# Patient Record
Sex: MALE | Race: WHITE | NOT HISPANIC OR LATINO | URBAN - METROPOLITAN AREA
[De-identification: names, ages, dates, MRNs, and addresses within clinical notes are randomized per-mention and may not be internally consistent; named-entity substitution may affect disease eponyms.]

---

## 2017-01-01 ENCOUNTER — HOME CARE VISIT (OUTPATIENT)
Dept: HOSPICE | Facility: HOSPICE | Age: 82
End: 2017-01-01
Payer: MEDICARE

## 2017-01-01 ENCOUNTER — RESOLUTE PROFESSIONAL BILLING HOSPITAL PROF FEE (OUTPATIENT)
Dept: HOSPITALIST | Facility: MEDICAL CENTER | Age: 82
End: 2017-01-01
Payer: COMMERCIAL

## 2017-01-01 ENCOUNTER — HOSPICE ADMISSION (OUTPATIENT)
Dept: HOSPICE | Facility: HOSPICE | Age: 82
End: 2017-01-01
Payer: MEDICARE

## 2017-01-01 ENCOUNTER — APPOINTMENT (OUTPATIENT)
Dept: RADIOLOGY | Facility: MEDICAL CENTER | Age: 82
DRG: 393 | End: 2017-01-01
Attending: EMERGENCY MEDICINE
Payer: MEDICARE

## 2017-01-01 ENCOUNTER — HOSPITAL ENCOUNTER (INPATIENT)
Facility: MEDICAL CENTER | Age: 82
LOS: 5 days | DRG: 057 | End: 2017-04-24
Attending: INTERNAL MEDICINE | Admitting: INTERNAL MEDICINE
Payer: COMMERCIAL

## 2017-01-01 ENCOUNTER — APPOINTMENT (OUTPATIENT)
Dept: RADIOLOGY | Facility: MEDICAL CENTER | Age: 82
DRG: 393 | End: 2017-01-01
Attending: FAMILY MEDICINE
Payer: MEDICARE

## 2017-01-01 ENCOUNTER — APPOINTMENT (OUTPATIENT)
Dept: RADIOLOGY | Facility: MEDICAL CENTER | Age: 82
DRG: 393 | End: 2017-01-01
Attending: INTERNAL MEDICINE
Payer: MEDICARE

## 2017-01-01 ENCOUNTER — HOSPITAL ENCOUNTER (INPATIENT)
Facility: MEDICAL CENTER | Age: 82
LOS: 3 days | DRG: 393 | End: 2017-04-19
Attending: EMERGENCY MEDICINE | Admitting: HOSPITALIST
Payer: MEDICARE

## 2017-01-01 VITALS
TEMPERATURE: 99.8 F | BODY MASS INDEX: 18.58 KG/M2 | SYSTOLIC BLOOD PRESSURE: 152 MMHG | OXYGEN SATURATION: 85 % | HEART RATE: 103 BPM | HEIGHT: 68 IN | RESPIRATION RATE: 20 BRPM | DIASTOLIC BLOOD PRESSURE: 91 MMHG | WEIGHT: 122.58 LBS

## 2017-01-01 VITALS
RESPIRATION RATE: 48 BRPM | BODY MASS INDEX: 17.26 KG/M2 | SYSTOLIC BLOOD PRESSURE: 70 MMHG | OXYGEN SATURATION: 73 % | WEIGHT: 110.23 LBS | DIASTOLIC BLOOD PRESSURE: 34 MMHG | HEART RATE: 60 BPM | TEMPERATURE: 98.9 F

## 2017-01-01 VITALS — HEART RATE: 68 BPM | RESPIRATION RATE: 16 BRPM | TEMPERATURE: 97.8 F

## 2017-01-01 VITALS — WEIGHT: 122 LBS | RESPIRATION RATE: 22 BRPM | BODY MASS INDEX: 19.15 KG/M2 | HEIGHT: 67 IN

## 2017-01-01 VITALS — RESPIRATION RATE: 40 BRPM

## 2017-01-01 DIAGNOSIS — T18.108A ESOPHAGEAL FOREIGN BODY, INITIAL ENCOUNTER: ICD-10-CM

## 2017-01-01 LAB
ALBUMIN SERPL BCP-MCNC: 3 G/DL (ref 3.2–4.9)
ALBUMIN SERPL BCP-MCNC: 3.6 G/DL (ref 3.2–4.9)
ALBUMIN/GLOB SERPL: 1.1 G/DL
ALBUMIN/GLOB SERPL: 1.2 G/DL
ALP SERPL-CCNC: 104 U/L (ref 30–99)
ALP SERPL-CCNC: 82 U/L (ref 30–99)
ALT SERPL-CCNC: 10 U/L (ref 2–50)
ALT SERPL-CCNC: 7 U/L (ref 2–50)
ANION GAP SERPL CALC-SCNC: 8 MMOL/L (ref 0–11.9)
ANION GAP SERPL CALC-SCNC: 9 MMOL/L (ref 0–11.9)
ANION GAP SERPL CALC-SCNC: 9 MMOL/L (ref 0–11.9)
APTT PPP: 32 SEC (ref 24.7–36)
AST SERPL-CCNC: 14 U/L (ref 12–45)
AST SERPL-CCNC: 14 U/L (ref 12–45)
BASE EXCESS BLDA CALC-SCNC: -10 MMOL/L (ref -4–3)
BASE EXCESS BLDA CALC-SCNC: -8 MMOL/L (ref -4–3)
BASE EXCESS BLDA CALC-SCNC: -8 MMOL/L (ref -4–3)
BASOPHILS # BLD AUTO: 0.2 % (ref 0–1.8)
BASOPHILS # BLD AUTO: 0.2 % (ref 0–1.8)
BASOPHILS # BLD AUTO: 0.4 % (ref 0–1.8)
BASOPHILS # BLD: 0.02 K/UL (ref 0–0.12)
BASOPHILS # BLD: 0.02 K/UL (ref 0–0.12)
BASOPHILS # BLD: 0.05 K/UL (ref 0–0.12)
BILIRUB SERPL-MCNC: 1.4 MG/DL (ref 0.1–1.5)
BILIRUB SERPL-MCNC: 1.5 MG/DL (ref 0.1–1.5)
BODY TEMPERATURE: ABNORMAL DEGREES
BUN SERPL-MCNC: 30 MG/DL (ref 8–22)
BUN SERPL-MCNC: 37 MG/DL (ref 8–22)
BUN SERPL-MCNC: 42 MG/DL (ref 8–22)
CALCIUM SERPL-MCNC: 8.6 MG/DL (ref 8.5–10.5)
CALCIUM SERPL-MCNC: 9.3 MG/DL (ref 8.5–10.5)
CALCIUM SERPL-MCNC: 9.5 MG/DL (ref 8.5–10.5)
CHLORIDE SERPL-SCNC: 107 MMOL/L (ref 96–112)
CHLORIDE SERPL-SCNC: 112 MMOL/L (ref 96–112)
CHLORIDE SERPL-SCNC: 112 MMOL/L (ref 96–112)
CO2 BLDA-SCNC: 16 MMOL/L (ref 20–33)
CO2 BLDA-SCNC: 17 MMOL/L (ref 20–33)
CO2 BLDA-SCNC: 20 MMOL/L (ref 20–33)
CO2 SERPL-SCNC: 16 MMOL/L (ref 20–33)
CO2 SERPL-SCNC: 18 MMOL/L (ref 20–33)
CO2 SERPL-SCNC: 21 MMOL/L (ref 20–33)
CREAT SERPL-MCNC: 1.88 MG/DL (ref 0.5–1.4)
CREAT SERPL-MCNC: 2.3 MG/DL (ref 0.5–1.4)
CREAT SERPL-MCNC: 2.51 MG/DL (ref 0.5–1.4)
EOSINOPHIL # BLD AUTO: 0 K/UL (ref 0–0.51)
EOSINOPHIL # BLD AUTO: 0.02 K/UL (ref 0–0.51)
EOSINOPHIL # BLD AUTO: 0.02 K/UL (ref 0–0.51)
EOSINOPHIL NFR BLD: 0 % (ref 0–6.9)
EOSINOPHIL NFR BLD: 0.2 % (ref 0–6.9)
EOSINOPHIL NFR BLD: 0.2 % (ref 0–6.9)
ERYTHROCYTE [DISTWIDTH] IN BLOOD BY AUTOMATED COUNT: 48.1 FL (ref 35.9–50)
ERYTHROCYTE [DISTWIDTH] IN BLOOD BY AUTOMATED COUNT: 51.4 FL (ref 35.9–50)
ERYTHROCYTE [DISTWIDTH] IN BLOOD BY AUTOMATED COUNT: 54.2 FL (ref 35.9–50)
GFR SERPL CREATININE-BSD FRML MDRD: 24 ML/MIN/1.73 M 2
GFR SERPL CREATININE-BSD FRML MDRD: 27 ML/MIN/1.73 M 2
GFR SERPL CREATININE-BSD FRML MDRD: 34 ML/MIN/1.73 M 2
GLOBULIN SER CALC-MCNC: 2.6 G/DL (ref 1.9–3.5)
GLOBULIN SER CALC-MCNC: 3.2 G/DL (ref 1.9–3.5)
GLUCOSE SERPL-MCNC: 114 MG/DL (ref 65–99)
GLUCOSE SERPL-MCNC: 115 MG/DL (ref 65–99)
GLUCOSE SERPL-MCNC: 126 MG/DL (ref 65–99)
HCO3 BLDA-SCNC: 15 MMOL/L (ref 17–25)
HCO3 BLDA-SCNC: 15.8 MMOL/L (ref 17–25)
HCO3 BLDA-SCNC: 18.6 MMOL/L (ref 17–25)
HCT VFR BLD AUTO: 40.2 % (ref 42–52)
HCT VFR BLD AUTO: 45.5 % (ref 42–52)
HCT VFR BLD AUTO: 45.7 % (ref 42–52)
HGB BLD-MCNC: 13.1 G/DL (ref 14–18)
HGB BLD-MCNC: 14.3 G/DL (ref 14–18)
HGB BLD-MCNC: 15.2 G/DL (ref 14–18)
IMM GRANULOCYTES # BLD AUTO: 0.04 K/UL (ref 0–0.11)
IMM GRANULOCYTES # BLD AUTO: 0.05 K/UL (ref 0–0.11)
IMM GRANULOCYTES # BLD AUTO: 0.05 K/UL (ref 0–0.11)
IMM GRANULOCYTES NFR BLD AUTO: 0.4 % (ref 0–0.9)
INR PPP: 1.07 (ref 0.87–1.13)
LYMPHOCYTES # BLD AUTO: 1.42 K/UL (ref 1–4.8)
LYMPHOCYTES # BLD AUTO: 1.46 K/UL (ref 1–4.8)
LYMPHOCYTES # BLD AUTO: 1.73 K/UL (ref 1–4.8)
LYMPHOCYTES NFR BLD: 12.8 % (ref 22–41)
LYMPHOCYTES NFR BLD: 13 % (ref 22–41)
LYMPHOCYTES NFR BLD: 15.4 % (ref 22–41)
MAGNESIUM SERPL-MCNC: 2.1 MG/DL (ref 1.5–2.5)
MCH RBC QN AUTO: 33 PG (ref 27–33)
MCH RBC QN AUTO: 33.3 PG (ref 27–33)
MCH RBC QN AUTO: 33.5 PG (ref 27–33)
MCHC RBC AUTO-ENTMCNC: 31.4 G/DL (ref 33.7–35.3)
MCHC RBC AUTO-ENTMCNC: 32.6 G/DL (ref 33.7–35.3)
MCHC RBC AUTO-ENTMCNC: 33.3 G/DL (ref 33.7–35.3)
MCV RBC AUTO: 102.8 FL (ref 81.4–97.8)
MCV RBC AUTO: 106.1 FL (ref 81.4–97.8)
MCV RBC AUTO: 99.1 FL (ref 81.4–97.8)
MONOCYTES # BLD AUTO: 0.95 K/UL (ref 0–0.85)
MONOCYTES # BLD AUTO: 1.27 K/UL (ref 0–0.85)
MONOCYTES # BLD AUTO: 1.4 K/UL (ref 0–0.85)
MONOCYTES NFR BLD AUTO: 11.7 % (ref 0–13.4)
MONOCYTES NFR BLD AUTO: 12.3 % (ref 0–13.4)
MONOCYTES NFR BLD AUTO: 8.5 % (ref 0–13.4)
NEUTROPHILS # BLD AUTO: 8.13 K/UL (ref 1.82–7.42)
NEUTROPHILS # BLD AUTO: 8.45 K/UL (ref 1.82–7.42)
NEUTROPHILS # BLD AUTO: 8.45 K/UL (ref 1.82–7.42)
NEUTROPHILS NFR BLD: 74.1 % (ref 44–72)
NEUTROPHILS NFR BLD: 74.5 % (ref 44–72)
NEUTROPHILS NFR BLD: 75.3 % (ref 44–72)
NRBC # BLD AUTO: 0 K/UL
NRBC BLD AUTO-RTO: 0 /100 WBC
O2/TOTAL GAS SETTING VFR VENT: 100 %
O2/TOTAL GAS SETTING VFR VENT: 30 %
O2/TOTAL GAS SETTING VFR VENT: 40 %
PCO2 BLDA: 28.1 MMHG (ref 26–37)
PCO2 BLDA: 29.4 MMHG (ref 26–37)
PCO2 BLDA: 39.6 MMHG (ref 26–37)
PCO2 TEMP ADJ BLDA: 26.9 MMHG (ref 26–37)
PCO2 TEMP ADJ BLDA: 29.4 MMHG (ref 26–37)
PCO2 TEMP ADJ BLDA: 39.3 MMHG (ref 26–37)
PH BLDA: 7.28 [PH] (ref 7.4–7.5)
PH BLDA: 7.31 [PH] (ref 7.4–7.5)
PH BLDA: 7.36 [PH] (ref 7.4–7.5)
PH TEMP ADJ BLDA: 7.28 [PH] (ref 7.4–7.5)
PH TEMP ADJ BLDA: 7.31 [PH] (ref 7.4–7.5)
PH TEMP ADJ BLDA: 7.37 [PH] (ref 7.4–7.5)
PHOSPHATE SERPL-MCNC: 3.5 MG/DL (ref 2.5–4.5)
PLATELET # BLD AUTO: 160 K/UL (ref 164–446)
PLATELET # BLD AUTO: 189 K/UL (ref 164–446)
PLATELET # BLD AUTO: 190 K/UL (ref 164–446)
PMV BLD AUTO: 9.4 FL (ref 9–12.9)
PMV BLD AUTO: 9.6 FL (ref 9–12.9)
PMV BLD AUTO: 9.8 FL (ref 9–12.9)
PO2 BLDA: 123 MMHG (ref 64–87)
PO2 BLDA: 459 MMHG (ref 64–87)
PO2 BLDA: 86 MMHG (ref 64–87)
PO2 TEMP ADJ BLDA: 117 MMHG (ref 64–87)
PO2 TEMP ADJ BLDA: 458 MMHG (ref 64–87)
PO2 TEMP ADJ BLDA: 86 MMHG (ref 64–87)
POTASSIUM SERPL-SCNC: 4.8 MMOL/L (ref 3.6–5.5)
POTASSIUM SERPL-SCNC: 4.9 MMOL/L (ref 3.6–5.5)
POTASSIUM SERPL-SCNC: 5.4 MMOL/L (ref 3.6–5.5)
PROT SERPL-MCNC: 5.6 G/DL (ref 6–8.2)
PROT SERPL-MCNC: 6.8 G/DL (ref 6–8.2)
PROTHROMBIN TIME: 14.2 SEC (ref 12–14.6)
PTH-INTACT SERPL-MCNC: 103.2 PG/ML (ref 14–72)
RBC # BLD AUTO: 3.91 M/UL (ref 4.7–6.1)
RBC # BLD AUTO: 4.29 M/UL (ref 4.7–6.1)
RBC # BLD AUTO: 4.61 M/UL (ref 4.7–6.1)
SAO2 % BLDA: 100 % (ref 93–99)
SAO2 % BLDA: 96 % (ref 93–99)
SAO2 % BLDA: 99 % (ref 93–99)
SODIUM SERPL-SCNC: 136 MMOL/L (ref 135–145)
SODIUM SERPL-SCNC: 137 MMOL/L (ref 135–145)
SODIUM SERPL-SCNC: 139 MMOL/L (ref 135–145)
SPECIMEN DRAWN FROM PATIENT: ABNORMAL
TSH SERPL DL<=0.005 MIU/L-ACNC: 1.56 UIU/ML (ref 0.3–3.7)
VIT B12 SERPL-MCNC: 685 PG/ML (ref 211–911)
WBC # BLD AUTO: 10.9 K/UL (ref 4.8–10.8)
WBC # BLD AUTO: 11.2 K/UL (ref 4.8–10.8)
WBC # BLD AUTO: 11.4 K/UL (ref 4.8–10.8)

## 2017-01-01 PROCEDURE — 700101 HCHG RX REV CODE 250

## 2017-01-01 PROCEDURE — 94003 VENT MGMT INPAT SUBQ DAY: CPT

## 2017-01-01 PROCEDURE — A9270 NON-COVERED ITEM OR SERVICE: HCPCS | Performed by: INTERNAL MEDICINE

## 2017-01-01 PROCEDURE — 85025 COMPLETE CBC W/AUTO DIFF WBC: CPT

## 2017-01-01 PROCEDURE — 700111 HCHG RX REV CODE 636 W/ 250 OVERRIDE (IP): Performed by: HOSPITALIST

## 2017-01-01 PROCEDURE — S9126 HOSPICE CARE, IN THE HOME, P: HCPCS

## 2017-01-01 PROCEDURE — 94002 VENT MGMT INPAT INIT DAY: CPT

## 2017-01-01 PROCEDURE — 70490 CT SOFT TISSUE NECK W/O DYE: CPT

## 2017-01-01 PROCEDURE — 160048 HCHG OR STATISTICAL LEVEL 1-5: Performed by: INTERNAL MEDICINE

## 2017-01-01 PROCEDURE — 51702 INSERT TEMP BLADDER CATH: CPT

## 2017-01-01 PROCEDURE — 304562 HCHG STAT O2 MASK/CANNULA

## 2017-01-01 PROCEDURE — 0DJ08ZZ INSPECTION OF UPPER INTESTINAL TRACT, VIA NATURAL OR ARTIFICIAL OPENING ENDOSCOPIC: ICD-10-PCS | Performed by: INTERNAL MEDICINE

## 2017-01-01 PROCEDURE — 71010 DX-CHEST-PORTABLE (1 VIEW): CPT

## 2017-01-01 PROCEDURE — 502240 HCHG MISC OR SUPPLY RC 0272: Performed by: INTERNAL MEDICINE

## 2017-01-01 PROCEDURE — 0BH17EZ INSERTION OF ENDOTRACHEAL AIRWAY INTO TRACHEA, VIA NATURAL OR ARTIFICIAL OPENING: ICD-10-PCS | Performed by: EMERGENCY MEDICINE

## 2017-01-01 PROCEDURE — 700111 HCHG RX REV CODE 636 W/ 250 OVERRIDE (IP): Performed by: INTERNAL MEDICINE

## 2017-01-01 PROCEDURE — G0155 HHCP-SVS OF CSW,EA 15 MIN: HCPCS

## 2017-01-01 PROCEDURE — 700102 HCHG RX REV CODE 250 W/ 637 OVERRIDE(OP): Performed by: INTERNAL MEDICINE

## 2017-01-01 PROCEDURE — G0299 HHS/HOSPICE OF RN EA 15 MIN: HCPCS

## 2017-01-01 PROCEDURE — 700111 HCHG RX REV CODE 636 W/ 250 OVERRIDE (IP): Performed by: EMERGENCY MEDICINE

## 2017-01-01 PROCEDURE — 71010 DX-CHEST-LIMITED (1 VIEW): CPT

## 2017-01-01 PROCEDURE — 94150 VITAL CAPACITY TEST: CPT

## 2017-01-01 PROCEDURE — 700111 HCHG RX REV CODE 636 W/ 250 OVERRIDE (IP)

## 2017-01-01 PROCEDURE — 83970 ASSAY OF PARATHORMONE: CPT

## 2017-01-01 PROCEDURE — 5A1945Z RESPIRATORY VENTILATION, 24-96 CONSECUTIVE HOURS: ICD-10-PCS | Performed by: EMERGENCY MEDICINE

## 2017-01-01 PROCEDURE — 770006 HCHG ROOM/CARE - MED/SURG/GYN SEMI*

## 2017-01-01 PROCEDURE — 99291 CRITICAL CARE FIRST HOUR: CPT

## 2017-01-01 PROCEDURE — 160029 HCHG SURGERY MINUTES - 1ST 30 MINS LEVEL 4: Performed by: SURGERY

## 2017-01-01 PROCEDURE — 304561 HCHG STAT O2

## 2017-01-01 PROCEDURE — 82803 BLOOD GASES ANY COMBINATION: CPT

## 2017-01-01 PROCEDURE — 0DC18ZZ EXTIRPATION OF MATTER FROM UPPER ESOPHAGUS, VIA NATURAL OR ARTIFICIAL OPENING ENDOSCOPIC: ICD-10-PCS | Performed by: SURGERY

## 2017-01-01 PROCEDURE — 700102 HCHG RX REV CODE 250 W/ 637 OVERRIDE(OP): Performed by: FAMILY MEDICINE

## 2017-01-01 PROCEDURE — 36600 WITHDRAWAL OF ARTERIAL BLOOD: CPT

## 2017-01-01 PROCEDURE — 96365 THER/PROPH/DIAG IV INF INIT: CPT

## 2017-01-01 PROCEDURE — 80053 COMPREHEN METABOLIC PANEL: CPT

## 2017-01-01 PROCEDURE — 84100 ASSAY OF PHOSPHORUS: CPT

## 2017-01-01 PROCEDURE — 160208 HCHG ENDO MINUTES - EA ADDL 1 MIN LEVEL 4: Performed by: INTERNAL MEDICINE

## 2017-01-01 PROCEDURE — 85730 THROMBOPLASTIN TIME PARTIAL: CPT

## 2017-01-01 PROCEDURE — 665036 HSPC NOTICE OF ELECTION NOE

## 2017-01-01 PROCEDURE — 99232 SBSQ HOSP IP/OBS MODERATE 35: CPT | Performed by: FAMILY MEDICINE

## 2017-01-01 PROCEDURE — 83735 ASSAY OF MAGNESIUM: CPT

## 2017-01-01 PROCEDURE — 70360 X-RAY EXAM OF NECK: CPT

## 2017-01-01 PROCEDURE — 306565 RIGID MIT RESTRAINT(PAIR): Performed by: HOSPITALIST

## 2017-01-01 PROCEDURE — 96361 HYDRATE IV INFUSION ADD-ON: CPT

## 2017-01-01 PROCEDURE — 99152 MOD SED SAME PHYS/QHP 5/>YRS: CPT

## 2017-01-01 PROCEDURE — A4606 OXYGEN PROBE USED W OXIMETER: HCPCS | Performed by: SURGERY

## 2017-01-01 PROCEDURE — 700105 HCHG RX REV CODE 258: Performed by: HOSPITALIST

## 2017-01-01 PROCEDURE — G0156 HHCP-SVS OF AIDE,EA 15 MIN: HCPCS

## 2017-01-01 PROCEDURE — 110382 HCHG SHELL REV 271: Performed by: SURGERY

## 2017-01-01 PROCEDURE — 36415 COLL VENOUS BLD VENIPUNCTURE: CPT

## 2017-01-01 PROCEDURE — 160203 HCHG ENDO MINUTES - 1ST 30 MINS LEVEL 4: Performed by: INTERNAL MEDICINE

## 2017-01-01 PROCEDURE — 502240 HCHG MISC OR SUPPLY RC 0272: Performed by: SURGERY

## 2017-01-01 PROCEDURE — 770022 HCHG ROOM/CARE - ICU (200)

## 2017-01-01 PROCEDURE — 700105 HCHG RX REV CODE 258: Performed by: EMERGENCY MEDICINE

## 2017-01-01 PROCEDURE — 160048 HCHG OR STATISTICAL LEVEL 1-5: Performed by: SURGERY

## 2017-01-01 PROCEDURE — 96375 TX/PRO/DX INJ NEW DRUG ADDON: CPT

## 2017-01-01 PROCEDURE — 303105 HCHG CATHETER EXTRA

## 2017-01-01 PROCEDURE — 85610 PROTHROMBIN TIME: CPT

## 2017-01-01 PROCEDURE — 82607 VITAMIN B-12: CPT

## 2017-01-01 PROCEDURE — 82803 BLOOD GASES ANY COMBINATION: CPT | Mod: 91

## 2017-01-01 PROCEDURE — 99291 CRITICAL CARE FIRST HOUR: CPT | Performed by: INTERNAL MEDICINE

## 2017-01-01 PROCEDURE — 99239 HOSP IP/OBS DSCHRG MGMT >30: CPT | Performed by: FAMILY MEDICINE

## 2017-01-01 PROCEDURE — 80048 BASIC METABOLIC PNL TOTAL CA: CPT

## 2017-01-01 PROCEDURE — 160009 HCHG ANES TIME/MIN: Performed by: SURGERY

## 2017-01-01 PROCEDURE — 31500 INSERT EMERGENCY AIRWAY: CPT

## 2017-01-01 PROCEDURE — A9270 NON-COVERED ITEM OR SERVICE: HCPCS | Performed by: FAMILY MEDICINE

## 2017-01-01 PROCEDURE — 99233 SBSQ HOSP IP/OBS HIGH 50: CPT | Performed by: HOSPITALIST

## 2017-01-01 PROCEDURE — 96366 THER/PROPH/DIAG IV INF ADDON: CPT

## 2017-01-01 PROCEDURE — 99223 1ST HOSP IP/OBS HIGH 75: CPT | Performed by: HOSPITALIST

## 2017-01-01 PROCEDURE — 500066 HCHG BITE BLOCK, ECT: Performed by: INTERNAL MEDICINE

## 2017-01-01 PROCEDURE — 84443 ASSAY THYROID STIM HORMONE: CPT

## 2017-01-01 RX ORDER — FINASTERIDE 5 MG/1
5 TABLET, FILM COATED ORAL DAILY
Status: ON HOLD | COMMUNITY
End: 2017-01-01

## 2017-01-01 RX ORDER — ACETAMINOPHEN 500 MG
500-1000 TABLET ORAL 3 TIMES DAILY
COMMUNITY
End: 2017-01-01

## 2017-01-01 RX ORDER — LORAZEPAM 2 MG/ML
1 CONCENTRATE ORAL EVERY 4 HOURS PRN
Status: DISCONTINUED | OUTPATIENT
Start: 2017-01-01 | End: 2017-01-01 | Stop reason: HOSPADM

## 2017-01-01 RX ORDER — AMOXICILLIN 250 MG
2 CAPSULE ORAL DAILY
Status: ON HOLD | COMMUNITY
End: 2017-01-01

## 2017-01-01 RX ORDER — HALOPERIDOL 0.5 MG/1
0.25 TABLET ORAL 3 TIMES DAILY
Status: ON HOLD | COMMUNITY
End: 2017-01-01

## 2017-01-01 RX ORDER — ENALAPRILAT 1.25 MG/ML
1.25 INJECTION INTRAVENOUS EVERY 6 HOURS PRN
Status: DISCONTINUED | OUTPATIENT
Start: 2017-01-01 | End: 2017-01-01

## 2017-01-01 RX ORDER — LORAZEPAM 2 MG/ML
3-4 INJECTION INTRAMUSCULAR EVERY 4 HOURS PRN
Status: DISCONTINUED | OUTPATIENT
Start: 2017-01-01 | End: 2017-01-01 | Stop reason: HOSPADM

## 2017-01-01 RX ORDER — POLYETHYLENE GLYCOL 3350 17 G/17G
17 POWDER, FOR SOLUTION ORAL DAILY
Status: ON HOLD | COMMUNITY
End: 2017-01-01

## 2017-01-01 RX ORDER — SULFAMETHOXAZOLE AND TRIMETHOPRIM 800; 160 MG/1; MG/1
1 TABLET ORAL 2 TIMES DAILY
Status: ON HOLD | COMMUNITY
End: 2017-01-01

## 2017-01-01 RX ORDER — HEPARIN SODIUM 5000 [USP'U]/ML
5000 INJECTION, SOLUTION INTRAVENOUS; SUBCUTANEOUS EVERY 8 HOURS
Status: DISCONTINUED | OUTPATIENT
Start: 2017-01-01 | End: 2017-01-01

## 2017-01-01 RX ORDER — PRAZOSIN HYDROCHLORIDE 1 MG/1
1 CAPSULE ORAL NIGHTLY
Status: ON HOLD | COMMUNITY
End: 2017-01-01

## 2017-01-01 RX ORDER — MIDAZOLAM HYDROCHLORIDE 1 MG/ML
1-5 INJECTION INTRAMUSCULAR; INTRAVENOUS
Status: DISCONTINUED | OUTPATIENT
Start: 2017-01-01 | End: 2017-01-01

## 2017-01-01 RX ORDER — ATROPINE SULFATE 10 MG/ML
2-4 SOLUTION/ DROPS OPHTHALMIC
Status: DISCONTINUED | OUTPATIENT
Start: 2017-01-01 | End: 2017-01-01 | Stop reason: HOSPADM

## 2017-01-01 RX ORDER — LORAZEPAM 2 MG/ML
1 INJECTION INTRAMUSCULAR EVERY 4 HOURS PRN
Status: DISCONTINUED | OUTPATIENT
Start: 2017-01-01 | End: 2017-01-01 | Stop reason: HOSPADM

## 2017-01-01 RX ORDER — LORAZEPAM 2 MG/ML
5 INJECTION INTRAMUSCULAR EVERY 4 HOURS PRN
Status: DISCONTINUED | OUTPATIENT
Start: 2017-01-01 | End: 2017-01-01 | Stop reason: HOSPADM

## 2017-01-01 RX ORDER — SUCCINYLCHOLINE CHLORIDE 20 MG/ML
INJECTION INTRAMUSCULAR; INTRAVENOUS
Status: COMPLETED | OUTPATIENT
Start: 2017-01-01 | End: 2017-01-01

## 2017-01-01 RX ORDER — SCOLOPAMINE TRANSDERMAL SYSTEM 1 MG/1
1 PATCH, EXTENDED RELEASE TRANSDERMAL
Status: DISCONTINUED | OUTPATIENT
Start: 2017-01-01 | End: 2017-01-01 | Stop reason: HOSPADM

## 2017-01-01 RX ORDER — ONDANSETRON 2 MG/ML
4 INJECTION INTRAMUSCULAR; INTRAVENOUS ONCE
Status: COMPLETED | OUTPATIENT
Start: 2017-01-01 | End: 2017-01-01

## 2017-01-01 RX ORDER — LORAZEPAM 2 MG/ML
1 CONCENTRATE ORAL EVERY 4 HOURS PRN
Status: CANCELLED | OUTPATIENT
Start: 2017-01-01

## 2017-01-01 RX ORDER — LIDOCAINE HYDROCHLORIDE 10 MG/ML
1-2 INJECTION, SOLUTION INFILTRATION; PERINEURAL
Status: DISCONTINUED | OUTPATIENT
Start: 2017-01-01 | End: 2017-01-01

## 2017-01-01 RX ORDER — MORPHINE SULFATE 10 MG/ML
5 INJECTION, SOLUTION INTRAMUSCULAR; INTRAVENOUS
Status: DISCONTINUED | OUTPATIENT
Start: 2017-01-01 | End: 2017-01-01 | Stop reason: HOSPADM

## 2017-01-01 RX ORDER — ENEMA 19; 7 G/133ML; G/133ML
1 ENEMA RECTAL
Status: DISCONTINUED | OUTPATIENT
Start: 2017-01-01 | End: 2017-01-01

## 2017-01-01 RX ORDER — LORAZEPAM 2 MG/ML
1-2 INJECTION INTRAMUSCULAR EVERY 4 HOURS PRN
Status: DISCONTINUED | OUTPATIENT
Start: 2017-01-01 | End: 2017-01-01 | Stop reason: HOSPADM

## 2017-01-01 RX ORDER — MORPHINE SULFATE 100 MG/5ML
5-10 SOLUTION ORAL
Status: DISCONTINUED | OUTPATIENT
Start: 2017-01-01 | End: 2017-01-01 | Stop reason: HOSPADM

## 2017-01-01 RX ORDER — POLYVINYL ALCOHOL 14 MG/ML
2 SOLUTION/ DROPS OPHTHALMIC EVERY 6 HOURS PRN
Status: CANCELLED | OUTPATIENT
Start: 2017-01-01

## 2017-01-01 RX ORDER — MIDAZOLAM HYDROCHLORIDE 1 MG/ML
INJECTION INTRAMUSCULAR; INTRAVENOUS
Status: COMPLETED
Start: 2017-01-01 | End: 2017-01-01

## 2017-01-01 RX ORDER — MIRTAZAPINE 7.5 MG/1
7.5 TABLET, FILM COATED ORAL
Status: ON HOLD | COMMUNITY
End: 2017-01-01

## 2017-01-01 RX ORDER — MORPHINE SULFATE 10 MG/ML
5-10 INJECTION, SOLUTION INTRAMUSCULAR; INTRAVENOUS
Status: DISCONTINUED | OUTPATIENT
Start: 2017-01-01 | End: 2017-01-01 | Stop reason: HOSPADM

## 2017-01-01 RX ORDER — NITROGLYCERIN 0.4 MG/1
0.4 TABLET SUBLINGUAL
COMMUNITY
End: 2017-01-01

## 2017-01-01 RX ORDER — SODIUM CHLORIDE 9 MG/ML
2000 INJECTION, SOLUTION INTRAVENOUS ONCE
Status: DISCONTINUED | OUTPATIENT
Start: 2017-01-01 | End: 2017-01-01 | Stop reason: ALTCHOICE

## 2017-01-01 RX ORDER — POLYVINYL ALCOHOL 14 MG/ML
2 SOLUTION/ DROPS OPHTHALMIC EVERY 6 HOURS PRN
Status: DISCONTINUED | OUTPATIENT
Start: 2017-01-01 | End: 2017-01-01 | Stop reason: HOSPADM

## 2017-01-01 RX ORDER — SODIUM CHLORIDE 9 MG/ML
1000 INJECTION, SOLUTION INTRAVENOUS ONCE
Status: COMPLETED | OUTPATIENT
Start: 2017-01-01 | End: 2017-01-01

## 2017-01-01 RX ORDER — MORPHINE SULFATE 4 MG/ML
2 INJECTION, SOLUTION INTRAMUSCULAR; INTRAVENOUS
Status: COMPLETED | OUTPATIENT
Start: 2017-01-01 | End: 2017-01-01

## 2017-01-01 RX ORDER — AMLODIPINE BESYLATE 5 MG/1
5 TABLET ORAL DAILY
Status: ON HOLD | COMMUNITY
End: 2017-01-01

## 2017-01-01 RX ORDER — OMEPRAZOLE 20 MG/1
20 CAPSULE, DELAYED RELEASE ORAL DAILY
Status: ON HOLD | COMMUNITY
End: 2017-01-01

## 2017-01-01 RX ORDER — MORPHINE SULFATE 100 MG/5ML
10 SOLUTION ORAL
Status: CANCELLED | OUTPATIENT
Start: 2017-01-01

## 2017-01-01 RX ORDER — MIDAZOLAM HYDROCHLORIDE 1 MG/ML
2.5 INJECTION INTRAMUSCULAR; INTRAVENOUS ONCE
Status: COMPLETED | OUTPATIENT
Start: 2017-01-01 | End: 2017-01-01

## 2017-01-01 RX ORDER — HALOPERIDOL 5 MG/ML
5 INJECTION INTRAMUSCULAR EVERY 4 HOURS PRN
Status: DISCONTINUED | OUTPATIENT
Start: 2017-01-01 | End: 2017-01-01

## 2017-01-01 RX ORDER — LORAZEPAM 2 MG/ML
1-5 CONCENTRATE ORAL
Status: DISCONTINUED | OUTPATIENT
Start: 2017-01-01 | End: 2017-01-01 | Stop reason: HOSPADM

## 2017-01-01 RX ORDER — ATROPINE SULFATE 10 MG/ML
2 SOLUTION/ DROPS OPHTHALMIC EVERY 4 HOURS PRN
Status: DISCONTINUED | OUTPATIENT
Start: 2017-01-01 | End: 2017-01-01 | Stop reason: HOSPADM

## 2017-01-01 RX ORDER — BISACODYL 10 MG
10 SUPPOSITORY, RECTAL RECTAL
Status: DISCONTINUED | OUTPATIENT
Start: 2017-01-01 | End: 2017-01-01 | Stop reason: ALTCHOICE

## 2017-01-01 RX ORDER — LORAZEPAM 2 MG/ML
1 INJECTION INTRAMUSCULAR EVERY 4 HOURS PRN
Status: CANCELLED | OUTPATIENT
Start: 2017-01-01

## 2017-01-01 RX ORDER — MIRTAZAPINE 15 MG/1
7.5 TABLET, FILM COATED ORAL NIGHTLY
COMMUNITY
End: 2017-01-01

## 2017-01-01 RX ORDER — LACTULOSE 20 G/30ML
30 SOLUTION ORAL
Status: DISCONTINUED | OUTPATIENT
Start: 2017-01-01 | End: 2017-01-01 | Stop reason: ALTCHOICE

## 2017-01-01 RX ORDER — HYDROCODONE BITARTRATE AND ACETAMINOPHEN 5; 325 MG/1; MG/1
0.5 TABLET ORAL EVERY 12 HOURS PRN
Status: ON HOLD | COMMUNITY
End: 2017-01-01

## 2017-01-01 RX ORDER — CHLORHEXIDINE GLUCONATE ORAL RINSE 1.2 MG/ML
15 SOLUTION DENTAL 2 TIMES DAILY
Status: DISCONTINUED | OUTPATIENT
Start: 2017-01-01 | End: 2017-01-01

## 2017-01-01 RX ORDER — SCOLOPAMINE TRANSDERMAL SYSTEM 1 MG/1
1 PATCH, EXTENDED RELEASE TRANSDERMAL
Status: CANCELLED | OUTPATIENT
Start: 2017-01-01

## 2017-01-01 RX ORDER — MORPHINE SULFATE 10 MG/ML
5 INJECTION, SOLUTION INTRAMUSCULAR; INTRAVENOUS
Status: CANCELLED | OUTPATIENT
Start: 2017-01-01

## 2017-01-01 RX ORDER — MORPHINE SULFATE 100 MG/5ML
10 SOLUTION ORAL
Status: DISCONTINUED | OUTPATIENT
Start: 2017-01-01 | End: 2017-01-01 | Stop reason: HOSPADM

## 2017-01-01 RX ORDER — ATROPINE SULFATE 10 MG/ML
2 SOLUTION/ DROPS OPHTHALMIC EVERY 4 HOURS PRN
Status: CANCELLED | OUTPATIENT
Start: 2017-01-01

## 2017-01-01 RX ADMIN — FENTANYL CITRATE 50 MCG: 50 INJECTION INTRAMUSCULAR; INTRAVENOUS at 21:25

## 2017-01-01 RX ADMIN — HEPARIN SODIUM 5000 UNITS: 5000 INJECTION, SOLUTION INTRAVENOUS; SUBCUTANEOUS at 14:48

## 2017-01-01 RX ADMIN — LORAZEPAM 1 MG: 2 SOLUTION, CONCENTRATE ORAL at 11:13

## 2017-01-01 RX ADMIN — FENTANYL CITRATE 100 MCG: 50 INJECTION INTRAMUSCULAR; INTRAVENOUS at 16:39

## 2017-01-01 RX ADMIN — MORPHINE SULFATE 10 MG: 100 SOLUTION ORAL at 14:07

## 2017-01-01 RX ADMIN — HEPARIN SODIUM 5000 UNITS: 5000 INJECTION, SOLUTION INTRAVENOUS; SUBCUTANEOUS at 09:28

## 2017-01-01 RX ADMIN — LORAZEPAM 1 MG: 2 INJECTION INTRAMUSCULAR; INTRAVENOUS at 15:17

## 2017-01-01 RX ADMIN — MORPHINE SULFATE 5 MG: 10 INJECTION INTRAVENOUS at 21:49

## 2017-01-01 RX ADMIN — MIDAZOLAM HYDROCHLORIDE 2.5 MG: 1 INJECTION INTRAMUSCULAR; INTRAVENOUS at 18:00

## 2017-01-01 RX ADMIN — PROPOFOL 40 MG: 10 INJECTION, EMULSION INTRAVENOUS at 16:02

## 2017-01-01 RX ADMIN — MORPHINE SULFATE 10 MG: 100 SOLUTION ORAL at 10:03

## 2017-01-01 RX ADMIN — SODIUM CHLORIDE 2000 ML: 9 INJECTION, SOLUTION INTRAVENOUS at 21:35

## 2017-01-01 RX ADMIN — MORPHINE SULFATE 10 MG: 100 SOLUTION ORAL at 03:49

## 2017-01-01 RX ADMIN — MORPHINE SULFATE 2 MG: 4 INJECTION INTRAVENOUS at 02:40

## 2017-01-01 RX ADMIN — SODIUM CHLORIDE 1000 ML: 9 INJECTION, SOLUTION INTRAVENOUS at 13:26

## 2017-01-01 RX ADMIN — PROPOFOL 30 MCG/KG/MIN: 10 INJECTION, EMULSION INTRAVENOUS at 16:12

## 2017-01-01 RX ADMIN — MORPHINE SULFATE 2 MG: 4 INJECTION INTRAVENOUS at 13:26

## 2017-01-01 RX ADMIN — MORPHINE SULFATE 5 MG: 10 INJECTION INTRAVENOUS at 15:17

## 2017-01-01 RX ADMIN — SCOPALAMINE 1 PATCH: 1 PATCH, EXTENDED RELEASE TRANSDERMAL at 09:00

## 2017-01-01 RX ADMIN — MORPHINE SULFATE 5 MG: 10 INJECTION INTRAVENOUS at 22:06

## 2017-01-01 RX ADMIN — MORPHINE SULFATE 10 MG: 100 SOLUTION ORAL at 07:25

## 2017-01-01 RX ADMIN — SCOPALAMINE 1 PATCH: 1 PATCH, EXTENDED RELEASE TRANSDERMAL at 16:27

## 2017-01-01 RX ADMIN — MIDAZOLAM HYDROCHLORIDE 2.5 MG: 1 INJECTION, SOLUTION INTRAMUSCULAR; INTRAVENOUS at 18:00

## 2017-01-01 RX ADMIN — MORPHINE SULFATE 5 MG: 10 INJECTION INTRAVENOUS at 18:29

## 2017-01-01 RX ADMIN — MORPHINE SULFATE 10 MG: 100 SOLUTION ORAL at 10:29

## 2017-01-01 RX ADMIN — FENTANYL CITRATE 50 MCG: 50 INJECTION INTRAMUSCULAR; INTRAVENOUS at 05:03

## 2017-01-01 RX ADMIN — MORPHINE SULFATE 5 MG: 10 INJECTION INTRAVENOUS at 15:04

## 2017-01-01 RX ADMIN — ONDANSETRON 4 MG: 2 INJECTION INTRAMUSCULAR; INTRAVENOUS at 13:27

## 2017-01-01 RX ADMIN — PROPOFOL 40 MCG/KG/MIN: 10 INJECTION, EMULSION INTRAVENOUS at 16:15

## 2017-01-01 RX ADMIN — POLYVINYL ALCOHOL 2 DROP: 14 SOLUTION/ DROPS OPHTHALMIC at 10:04

## 2017-01-01 RX ADMIN — PROPOFOL 40 MG: 10 INJECTION, EMULSION INTRAVENOUS at 16:04

## 2017-01-01 RX ADMIN — MORPHINE SULFATE 5 MG: 10 INJECTION INTRAVENOUS at 05:33

## 2017-01-01 RX ADMIN — CHLORHEXIDINE GLUCONATE 15 ML: 1.2 RINSE ORAL at 01:15

## 2017-01-01 RX ADMIN — PROPOFOL 15 MCG/KG/MIN: 10 INJECTION, EMULSION INTRAVENOUS at 01:26

## 2017-01-01 RX ADMIN — MORPHINE SULFATE 5 MG: 10 INJECTION INTRAVENOUS at 12:23

## 2017-01-01 RX ADMIN — ENALAPRILAT 1.25 MG: 1.25 INJECTION INTRAVENOUS at 21:51

## 2017-01-01 RX ADMIN — CHLORHEXIDINE GLUCONATE 15 ML: 1.2 RINSE ORAL at 09:28

## 2017-01-01 RX ADMIN — MORPHINE SULFATE 5 MG: 10 INJECTION INTRAVENOUS at 16:57

## 2017-01-01 RX ADMIN — MORPHINE SULFATE 10 MG: 100 SOLUTION ORAL at 12:00

## 2017-01-01 RX ADMIN — SUCCINYLCHOLINE CHLORIDE 100 MG: 20 INJECTION, SOLUTION INTRAMUSCULAR; INTRAVENOUS at 16:05

## 2017-01-01 RX ADMIN — LORAZEPAM 1 MG: 2 INJECTION INTRAMUSCULAR; INTRAVENOUS at 15:04

## 2017-01-01 RX ADMIN — MORPHINE SULFATE 10 MG: 10 INJECTION INTRAVENOUS at 16:15

## 2017-01-01 RX ADMIN — MORPHINE SULFATE 10 MG: 10 INJECTION INTRAVENOUS at 10:38

## 2017-01-01 RX ADMIN — MORPHINE SULFATE 5 MG: 10 INJECTION INTRAVENOUS at 03:19

## 2017-01-01 RX ADMIN — MORPHINE SULFATE 5 MG: 10 INJECTION INTRAVENOUS at 13:14

## 2017-01-01 RX ADMIN — MORPHINE SULFATE 10 MG: 100 SOLUTION ORAL at 17:18

## 2017-01-01 RX ADMIN — FAMOTIDINE 20 MG: 10 INJECTION, SOLUTION INTRAVENOUS at 09:28

## 2017-01-01 SDOH — ECONOMIC STABILITY: HOUSING INSECURITY: UNSAFE APPLIANCES: 0

## 2017-01-01 SDOH — ECONOMIC STABILITY: GENERAL

## 2017-01-01 SDOH — ECONOMIC STABILITY: HOUSING INSECURITY: HOME SAFETY: HOSPITALIZED HOSPICE PT. BED BOUND.

## 2017-01-01 SDOH — ECONOMIC STABILITY: HOUSING INSECURITY: HOME SAFETY: NO SAFETY CONCERNS

## 2017-01-01 SDOH — ECONOMIC STABILITY: GENERAL: NECESSITIES UNABLE TO AFFORD: MEDICAL EXPENSES

## 2017-01-01 SDOH — ECONOMIC STABILITY: HOUSING INSECURITY: HOME SAFETY: NO SAFETY CONCERNS AT PRESENT

## 2017-01-01 SDOH — ECONOMIC STABILITY: HOUSING INSECURITY: UNSAFE COOKING RANGE AREA: 0

## 2017-01-01 ASSESSMENT — PAIN SCALES - GENERAL
PAINLEVEL_OUTOF10: 0
PAINLEVEL_OUTOF10: 2
PAINLEVEL_OUTOF10: 0
PAINLEVEL_OUTOF10: 3
PAINLEVEL_OUTOF10: 0
PAINLEVEL_OUTOF10: 2

## 2017-01-01 ASSESSMENT — PULMONARY FUNCTION TESTS
FVC: .97
FVC: .95

## 2017-01-01 ASSESSMENT — ENCOUNTER SYMPTOMS
INTRACTABLE COUGH: 1
DRY SKIN: 1
STOOL FREQUENCY: LESS THAN DAILY
DRY SKIN: 1
FATIGUES EASILY: 1
STOOL FREQUENCY: LESS THAN DAILY
CHEST TIGHTNESS: 1

## 2017-01-01 ASSESSMENT — ACTIVITIES OF DAILY LIVING (ADL)
MONEY MANAGEMENT (EXPENSES/BILLS): TOTALLY DEPENDENT
MONEY MANAGEMENT (EXPENSES/BILLS): TOTALLY DEPENDENT

## 2017-01-01 ASSESSMENT — LIFESTYLE VARIABLES: REASON UNABLE TO ASSESS: INTUBATED/SEDATED

## 2017-04-16 PROBLEM — T18.9XXA FOREIGN BODY ALIMENTARY TRACT: Status: ACTIVE | Noted: 2017-01-01

## 2017-04-16 NOTE — ED NOTES
"Chief Complaint   Patient presents with   • Foreign Body Swallowed     Pt to er from [facility]. Nurses report pt has had trouble swallowing medication since yesterday, reports sore throat. Nurses today realized that patient's \"bridge\" or tooth retainer is missing. EMS states they searched patient's apartment. PT able to speak, no drooling, swallowing secretions. RT distress observed. PT even and unlabored breathing. Family reports to nursing home \"hoarse/raspy voice\".    After initial triage, pt seen moving around bed, coughing, sounds like phlegm stuck in throat. Nonproductive Pt clutching throat with grimace on face. Still able to speak.     "

## 2017-04-17 PROBLEM — I10 HTN (HYPERTENSION): Status: ACTIVE | Noted: 2017-01-01

## 2017-04-17 PROBLEM — N17.9 ACUTE KIDNEY FAILURE (HCC): Status: ACTIVE | Noted: 2017-01-01

## 2017-04-17 PROBLEM — F02.818 LATE ONSET ALZHEIMER'S DISEASE WITH BEHAVIORAL DISTURBANCE (HCC): Status: ACTIVE | Noted: 2017-01-01

## 2017-04-17 PROBLEM — J96.01 ACUTE RESPIRATORY FAILURE WITH HYPOXIA (HCC): Status: ACTIVE | Noted: 2017-01-01

## 2017-04-17 PROBLEM — G30.1 LATE ONSET ALZHEIMER'S DISEASE WITH BEHAVIORAL DISTURBANCE (HCC): Status: ACTIVE | Noted: 2017-01-01

## 2017-04-17 NOTE — CARE PLAN
Problem: Communication  Goal: The ability to communicate needs accurately and effectively will improve  Outcome: PROGRESSING SLOWER THAN EXPECTED  Patient is vented and sedated with history of PTSD and dimentia/Alzheimers. Patient is often disoriented and combative, which makes communication difficult. Patient potential candidate for extubation today, will reassess ability to communicate with each interaction.     Problem: Venous Thromboembolism (VTW)/Deep Vein Thrombosis (DVT) Prevention:  Goal: Patient will participate in Venous Thrombosis (VTE)/Deep Vein Thrombosis (DVT)Prevention Measures  Outcome: PROGRESSING AS EXPECTED  Patient started on heparin injections for chemical DVT prophylaxis. Patient makes frequent movements and positional changes in bed. WIll assess for mobility tolerance with PT/OT. Use of SCD sleeves increases agitation.

## 2017-04-17 NOTE — ED NOTES
RN, Dr. Brewer, RT, and GI  Continue at bedside with scope procedure. Pt tolerating better with additional sedation. BP maintaining. 157/83

## 2017-04-17 NOTE — CONSULTS
DATE OF SERVICE:  04/16/2017    CHIEF COMPLAINT:  Dysphagia.    HISTORY OF PRESENT ILLNESS:  We were asked by emergency room physician to   consult emergently on this patient who was brought in from nursing home.  He   has underlying dementia.  He apparently swallowed a part of a denture or bridge or the two   all together both these items though he cannot provide history.  I requested   intubation of the patient.  The ER physician tried with light sedation first and Rolan Monahan   to see if he could discern anything in the piriform sinuses or in the   hypopharynx and was unable to.  When I encountered the patient, he was sedated   and comfortable on propofol.  There was no history available.  The patient   apparently has GERD, dementia, and I believe also hypercholesterolemia.    SOCIAL HISTORY:  Unable to obtain.    FAMILY HISTORY:  Unable to obtain.    REVIEW OF SYSTEMS:  Unable to obtain.  Other than that, the patient is a   nursing home patient.  I am not sure what his functional level is.    MEDICATIONS:  Unknown at this point.    ALLERGIES:  Unknown at this point.    PHYSICAL EXAMINATION:  VITAL SIGNS:  The patient is sedated and vitals are stable.  They are within   normal limits.  GENERAL:  The patient is comfortable.  NECK:  Does not reveal asymmetry.  Endotracheal tube is in place.  There is no   crepitus.  CHEST:  Mechanical ventilation and unlabored.  Clear.  CARDIOVASCULAR:  RR.  No increased neck veins.  ABDOMEN:  Soft, nondistended.  No rebound, no rigidity.  EXTREMITIES:  No CCE.  NEUROLOGIC/PSYCHIATRIC:  Patient is sedated, otherwise unable to tell.    IMPRESSION:  1.  Foreign body.  2.  Dysphagia, acute.  3.  Severe pain.  4.  Leukocytosis of 11.2.  5.  Creatinine 2.3.  Renal failure.  6.  CAT scan of the soft tissue of the neck revealed an ill-defined increased   density in the hypopharynx, upper esophagus represents dense liquid/medication   or contrast versus radiopaque foreign body.  This is ill  defined and somewhat   forms the shape of the hypopharynx.  There is esophageal thickening as well   in that area.    RECOMMENDATIONS:  EGD.  Further recommendation to follow.  The risks of the   procedure are considerable.  We had 2-physician consent given the emergency   and we were unable to reach the family.  Prior to the procedure, the ER   physician had discussed the case with the family and explained what was going   to happen, however.    We thank you for this consultation and will follow the patient as this further   evolves pending the upper endoscopy.       ____________________________________     Delbert Pina MD EMD / N[facility]    DD:  04/16/2017 18:40:05  DT:  04/16/2017 21:27:26    D#:  [id number]  Job#:  [id number]

## 2017-04-17 NOTE — CONSULTS
Surgery Consult Note  -------------------------------------------------------------------------------------------------  Date: 4/16/2017    Consulting Physician: Jan Walker M.D. [facility]  -------------------------------------------------------------------------------------------------    Reason for consultation: foreign body in esophagus    HPI: This is a 89 y.o. male who is presenting after swallowing a dental bridge and having it become lodged in the esophagus.  Patient is intubated and GI has already attempted fiber-optic extraction but unable to remove it.    Past Medical History   Diagnosis Date   • Alzheimer disease    • Hypertension    • Prostate enlargement    • GERD (gastroesophageal reflux disease)    • Constipation    • PTSD (post-traumatic stress disorder)    • Nightmares associated with chronic post-traumatic stress disorder    • History of frequent urinary tract infections    • Delusions ([facility])    • Hallucinations        Past Surgical History   Procedure Laterality Date   • Stent placement       x2 kidney       Current Facility-Administered Medications   Medication Dose Route Frequency Provider Last Rate Last Dose   • morphine (pf) 4 mg/ml injection 2 mg  2 mg Intravenous Q30 MIN PRN Finn Brewer M.D.   2 mg at 04/16/17 1326   • fentaNYL (SUBLIMAZE) injection 100 mcg  100 mcg Intravenous Q HOUR PRN Finn Brewer M.D.       • midazolam (VERSED) injection 1-5 mg  1-5 mg Intravenous Q HOUR PRN Finn Brewer M.D.       • propofol (DIPRIVAN) infusion  0-80 mcg/kg/min Intravenous Continuous Finn Brewer M.D. 14.2 mL/hr at 04/16/17 1615 40 mcg/kg/min at 04/16/17 4198     Current Outpatient Prescriptions   Medication Sig Dispense Refill   • amlodipine (NORVASC) 5 MG Tab Take 5 mg by mouth every day.     • Nutritional Supplements (ENSURE PO) Take  by mouth.     • finasteride (PROSCAR) 5 MG Tab Take 5 mg by mouth every day.     • acetaminophen (TYLENOL) 500 MG Tab Take 500-1,000 mg  "by mouth 3 times a day.     • mirtazapine (REMERON) 15 MG Tab Take 7.5 mg by mouth every evening.     • omeprazole (PRILOSEC) 20 MG delayed-release capsule Take 40 mg by mouth every day.     • polyethylene glycol/lytes (MIRALAX) Pack Take 17 g by mouth every day.     • prazosin (MINIPRESS) 1 MG Cap Take 1 mg by mouth every evening.     • sulfamethoxazole-trimethoprim (BACTRIM DS) 800-160 MG tablet Take 1 Tab by mouth 2 times a day.     • senna-docusate (PERICOLACE OR SENOKOT S) 8.6-50 MG Tab Take 2 Tabs by mouth every day.     • haloperidol (HALDOL) 0.5 MG Tab Take 0.25 mg by mouth 3 times a day.     • hydrocodone-acetaminophen (NORCO) 5-325 MG Tab per tablet Take 0.5 Tabs by mouth every 12 hours as needed.     • nitroglycerin (NITROSTAT) 0.4 MG SL Tab Place 0.4 mg under tongue every 5 minutes as needed for Chest Pain.         Social History     Social History   • Marital Status: Unknown     Spouse Name: N/A   • Number of Children: N/A   • Years of Education: N/A     Occupational History   • Not on file.     Social History Main Topics   • Smoking status: Never Smoker    • Smokeless tobacco: Not on file   • Alcohol Use: No   • Drug Use: No   • Sexual Activity: Not on file     Other Topics Concern   • Not on file     Social History Narrative   • No narrative on file       History reviewed. No pertinent family history.    Allergies:  Review of patient's allergies indicates not on file.    Review of Systems:  Unable to obtain, patient intubated    Physical Exam:  Blood pressure 160/79, pulse 66, temperature 36.8 °C (98.3 °F), resp. rate 18, height 1.727 m (5' 7.99\"), weight 58.968 kg (130 lb), SpO2 100 %.    Constitutional: Intubated, sedated  HEENT:  Normocephalic and atraumatic, no icterus  Neck:   Supple, no JVD, no crepitus  Cardiovascular: Regular rate and rhythm,   Pulmonary:  Good air entry bilaterally,   Abdominal:  Soft, non-tender, non-distended     Aortic impulse not widened  Musculoskeletal: No edema, no " tenderness  Neurological:  Intubated, sedated  Skin:   Skin is warm and dry. No rash noted.    Labs:  Recent Labs      04/16/17   1327   WBC  11.2*   RBC  4.61*   HEMOGLOBIN  15.2   HEMATOCRIT  45.7   MCV  99.1*   MCH  33.0   MCHC  33.3*   RDW  48.1   PLATELETCT  189   MPV  9.8     Recent Labs      04/16/17   1327   SODIUM  136   POTASSIUM  4.9   CHLORIDE  107   CO2  21   GLUCOSE  126*   BUN  37*   CREATININE  2.30*   CALCIUM  9.5     Recent Labs      04/16/17   1327   APTT  32.0   INR  1.07     Recent Labs      04/16/17   1327   ASTSGOT  14   ALTSGPT  10   TBILIRUBIN  1.5   ALKPHOSPHAT  104*   GLOBULIN  3.2   INR  1.07       Radiology:  CT Neck:  1.  Ill-defined area of increased attenuation in the hypopharynx/upper esophagus which appears to form the shape of the hypopharynx and suggestive of dense radiopaque fluid, medication or contrast. No metallic foreign body identified. There is circumferential wall thickening involving the upper esophagus which could be related to stricture or mass.    Assessment: This is a 89 y.o. Male with a dental bridge lodged in the upper esophagus.  Unable to extract with fiberoptic endoscopy.  No evidence of perforation. Intubated. Stable    Plan:   Will schedule rigid esophagoscopy for 0800 Monday AM.  Have asked Dr. Teixeira to consult and perform the procedure as she has expertise in this area.    Discussed with ED physician. Request hospitalist admission to ICU. Will remain intubated overnight.    I obtained telephone consent from the daughter, She Russell ([phone]) who is next of kin.  I explained the details of the operation, alternatives, and potential risks, including but not limited to bleeding, infection, injury to vessels or nerves, possible perforation requiring surgery, and risks of anesthesia.  All questions were answered. Daughter understands and agrees to proceed.      Jan Walker M.D.  [facility]  [phone]  Cell: [phone]

## 2017-04-17 NOTE — CARE PLAN
Problem: Pain Management  Goal: Pain level will decrease to patient’s comfort goal  Outcome: PROGRESSING AS EXPECTED  VS and pain assessed q2hr    Problem: Skin Integrity  Goal: Risk for impaired skin integrity will decrease  Outcome: PROGRESSING AS EXPECTED  Skin assessed q2hr turns in place

## 2017-04-17 NOTE — H&P
CHIEF COMPLAINT:  Brought in by a caretaker for difficulty swallowing.    PRIMARY MEDICAL PHYSICIAN:  Unable to obtain.    HISTORY OF PRESENT ILLNESS:  This is an 89-year-old gentleman who carries a   history of Alzheimer's dementia, hypertension, BPH, and PTSD who is a resident   at a care facility and was brought in by caretakers after having difficulty   swallowing medication earlier this evening.  The patient was emergently   intubated at the time of arrival in the ER for airway protection, therefore,   history is obtained in discussion with the emergency room physician as well as   review of the medical records.  Apparently, the patient had a dental bridge   which was missing.  The patient was able to speak and was not having shortness   of breath.  He was not drooling.  He did report severe throat pain and had   witnessed gagging and coughing.  As the patient's partial bridge could not be   found by staff members, there was a concern that he may have swallowed it.    Therefore, he was brought to the hospital for further evaluation.  No   additional history can be obtained.    REVIEW OF SYSTEMS:  Unable to obtain from patient who is currently intubated   and sedated.    PAST MEDICAL HISTORY:  1.  Hypertension.  2.  Alzheimer's dementia.  3.  BPH.  4.  PTSD.    PAST SURGICAL HISTORY:  1.  Kidney stents.    SOCIAL HISTORY:  No tobacco, alcohol, or illicit drugs.    FAMILY HISTORY:  Unable to obtain.    ALLERGIES:  No known drug allergies.    HOME MEDICATIONS:  1.  Norvasc 5 mg p.o. daily.  2.  Proscar 5 mg p.o. daily.  3.  Omeprazole 20 mg p.o. daily.  4.  MiraLax.  5.  Minipress 1 mg p.o. daily.  6.  Senna and docusate.  7.  Haldol 0.25 mg p.o. t.i.d.  8.  Hydrocodone 5/325 p.o. q. 12 hours p.r.n. moderate-to-severe pain.  9.  Remeron 7.5 mg p.o. daily.    PHYSICAL EXAMINATION:  VITAL SIGNS:  Temperature 98.3, heart rate 88, respirations 17, blood pressure   158/77.  The patient is saturating at 99% on a  ventilator.  GENERAL:  This is an elderly and frail white male who appears comfortable on   the ventilator.  He is in no acute distress.  HEENT:  Normocephalic, atraumatic, moist mucous membranes.  NECK:  Supple, there is no JVD.  There is no supraclavicular adenopathy.  CARDIOVASCULAR:  Positive S1, S2, regular rate and rhythm.  No murmurs, rubs,   or gallops appreciated.  Distant heart sounds.  PULMONARY:  Clear to auscultation bilaterally.  No wheezes, rubs, or rhonchi   heard.  GASTROINTESTINAL:  Soft, nontender, nondistended.  Positive bowel sounds.  No   hepatosplenomegaly.  EXTREMITIES:  Warm, well-perfused:  No clubbing, cyanosis, or edema.    Capillary refill less than 3 seconds.  Distal pulses are intact.  NEUROLOGIC:  Unable to perform full neuro exam as the patient is currently   sedated and on a ventilator.    STUDIES:  WBC 11.2, hemoglobin 15.2, hematocrit 45.7, platelet ____.  Sodium   136, potassium 4.9, chloride 107, CO2 of 21, glucose 126, BUN 27, creatinine   2.3.  GFR is 27, AST 14, ALT 10, alkaline phosphatase 104.    IMAGING:  CT of the soft tissue of the neck:  Ill-defined area of increased   attenuation in the hypopharynx, upper esophagus which appears deformed shape   of the hypopharynx and suggestive of dense radiopaque fluid, medication, or   contrast.  No metallic foreign body identified.  There is a circumferential   wall thickening involving the upper esophagus, which could be related to   stricture or mass.    ASSESSMENT AND PLAN:  This is an 89-year-old gentleman who was brought in from   a care facility after having complaints of throat pain, difficulty swallowing   and with concerns for having swallowed his partial dentures.  1.  Foreign body in the alimentary tract:  The patient was intubated at the   time of presentation to the ER for airway protection.  Dr. Pina of   gastroenterology did a scope; however, he was unable to retrieve the foreign   body.  I have discussed this  patient with Dr. Walker and Dr. Teixeira has already taken   the patient to the operating room and she was able to retrieve the dentures.    In the postoperative setting, the patient remains intubated.  He will be   monitored overnight in the intensive care unit and I defer to pulmonary   medicine for attempt to extubate tomorrow morning.  I have placed an order for   speech language evaluation to assess safety and swallowing.  2.  Acute renal failure:  The patient's baseline is unknown, I have no   previous test to compare.  This may potentially be chronic disease.  He will   be on intravenous fluids and we will monitor his renal function in the   morning.  If he has no improvement, then we will do further workup.  3.  Hypertension:  Holding home Norvasc and Minipress for now, I have added   p.r.n. intravenous antihypertensives while he is n.p.o.  4.  Alzheimer's dementia:  I am holding the patient's home Haldol and Remeron.    I have added p.r.n. intravenous Haldol in the event if he becomes more   agitated.  5.  Benign prostatic hypertrophy:  Hold home Proscar until he is able to take   pills by mouth.    DISPOSITION:  ICU.    PROPHYLAXIS:  Sequential compression devices for DVT prophylaxis, no PPI   indicated at this time, stool softeners ordered.    CODE:  Full.       ____________________________________     MD LUCIA ANAYA / NTS    DD:  04/16/2017 21:01:25  DT:  04/16/2017 22:09:52    D#:  [id number]  Job#:  [id number]

## 2017-04-17 NOTE — ED NOTES
CONTACT INFORMATION:    1) YOSVANY (daughter) cell [phone]  [place]inch [phone]    2) ETELVINA (daughter) [phone]    3) VAISHNAVI  (daughter) [phone]

## 2017-04-17 NOTE — PROGRESS NOTES
Dr. Larsen at bedside with family to address code status prior to extubation. Shamika (POA) and Radha at bedside. Family decision to transfer to comfort care.

## 2017-04-17 NOTE — ED NOTES
Procedure complete, unable to retrieve dentures. GI doc to call family and notify them. Dr. Brewer made aware. PT to be admitted to ICU and they will attempt again tomorrow with different equipment. PT resting comfortably. Propofol maintained at 40mcg, Will continue to monitor VS and patient.

## 2017-04-17 NOTE — RESPIRATORY CARE
Extubation    Cuff leak noted Yes  Stridor present No        O2 (LPM): 5  Nasal Cannula     Patient toleration Yes  RCP Complete? Yes  Events/Summary/Plan: Pt extubated per MD order and family wishes. (04/17/17 5170)

## 2017-04-17 NOTE — PROGRESS NOTES
Pulmonary Critical Care Progress Note        Date of Service:  4/17/17    Chief Complaint: Foreign body swallowed. Respiratory distress.     History of Present Illness: This is an 89-year-old male with a history of advanced Alzheimer's dementia apparently swallowing his upper denture plate resulting in a choking episode and acute hypoxic respiratory failure.  The patient to the OR with removal of the bridge and remains intubated and sedated at this time.      ROS:    Respiratory: unable to perform due to the patient's inability to effectively communicate   Cardiac: unable to perform due to the patient's inability to effectively communicate   GI: unable to perform due to the patient's inability to effectively communicate.    All other systems negative.      Interval Events:  24 hour interval history reviewed   Bronchoscopy retrieval failed but foreign body removed since by Dr. Teixeira in OR.   Opens eye to voice. Combative with sedation vacation. PERRL. Follows commands.  Spontaneous movement. Almost pulled ETT.  Prop 5  Morphine and fent X1 last night   SB/SR  CXR show infiltrate to left base. Otherwise clear. Flattened diaphragm.   SBT hour this morning with -16 NIF       PFSH:  No change.      Respiratory:  Jeffrey Vent Mode: ASV, PEEP/CPAP: 8, FiO2: 30, Control VTE (exp VT): 506  Pulse Oximetry: 98 %  Chest Tube Drains:  Exam: Currently on spontaneously breathing trial. Clear to auscultation bilaterally.   ImagingAvailable data reviewed       Recent Labs      04/16/17   1641  04/16/17   2129  04/17/17   0457   ISTATAPH  7.281*  7.357*  7.314*   ISTATAPCO2  39.6*  28.1  29.4   ISTATAPO2  459*  123*  86   ISTATATCO2  20  17*  16*   [id number]  100*  99  96   ISTATARTHCO3  18.6  15.8*  15.0*   ISTATARTBE  -8*  -8*  -10*   ISTATTEMP  36.8 C  36.0 C  98.6 F   ISTATFIO2  100  40  30   ISTATSPEC  Arterial  Arterial  Arterial   ISTATAPHTC  7.284*  7.371*  7.314*   [id number]  458*  117*  86       HemoDynamics:  Pulse:  65, Heart Rate (Monitored): 62  Blood Pressure : 160/79 mmHg, NIBP: 123/60 mmHg   Exam: Regular rate and rhythm. No murmur.   Imaging: Available data reviewed      Neuro:  GCS Total [place] Coma Score: 10   Exam: Opens eyes to voice. Prop 5.  Imaging: Available data reviewed      Fluids:  Intake/Output       04/15/17 0700 - 17 0659 (Not Admitted) 17 07 - 17 0659 17 07 - 17 0659       2006-3354 Total 1900-0659 Total [id number] Total       Intake    I.V.  --  -- --  --  753.5 753.5  --  -- --    Propofol Volume -- -- -- -- 128.5 128.5 -- -- --    IV Volume (NS) -- -- -- -- 625 625 -- -- --    Total Intake -- -- -- -- 753.5 753.5 -- -- --       Output    Urine  --  -- --  --  245 245  --  -- --    Indwelling Cathether -- -- -- -- 245 245 -- -- --    Total Output -- -- -- -- 245 245 -- -- --       Net I/O     -- -- -- -- 508.5 508.5 -- -- --        Weight: 55.6 kg (122 lb 9.2 oz)  Recent Labs      17   1327  17   0350   SODIUM  136  137   POTASSIUM  4.9  4.8   CHLORIDE  107  112   CO2  21  16*   BUN  37*  30*   CREATININE  2.30*  1.88*   MAGNESIUM   --   2.1   PHOSPHORUS   --   3.5   CALCIUM  9.5  8.6       GI/Nutrition:  Exam: Bowel sounds present. Non distended.   Imaging: Available data reviewed  NPO       Liver Function  Recent Labs      17   1327  17   0350   ALTSGPT  10  7   ASTSGOT  14  14   ALKPHOSPHAT  104*  82   TBILIRUBIN  1.5  1.4   GLUCOSE  126*  114*       Heme:  Recent Labs      17   1327  17   0354   RBC  4.61*  3.91*   HEMOGLOBIN  15.2  13.1*   HEMATOCRIT  45.7  40.2*   PLATELETCT  189  160*   PROTHROMBTM  14.2   --    APTT  32.0   --    INR  1.07   --        Infectious Disease:  Temp  Av.4 °C (97.5 °F)  Min: 36 °C (96.8 °F)  Max: 36.8 °C (98.3 °F)  Micro: reviewed  Recent Labs      17   1327  17   0350  17   0354   WBC  11.2*   --   10.9*   NEUTSPOLYS  75.30*   --   74.50*    LYMPHOCYTES  15.40*   --   13.00*   MONOCYTES  8.50   --   11.70   EOSINOPHILS  0.20   --   0.20   BASOPHILS  0.20   --   0.20   ASTSGOT  14  14   --    ALTSGPT  10  7   --    ALKPHOSPHAT  104*  82   --    TBILIRUBIN  1.5  1.4   --      Current Facility-Administered Medications   Medication Dose Frequency Provider Last Rate Last Dose   • fentaNYL (SUBLIMAZE) injection 100 mcg  100 mcg Q HOUR PRN Finn Brewer M.D.   50 mcg at 04/17/17 0503   • midazolam (VERSED) injection 1-5 mg  1-5 mg Q HOUR PRN Finn Brewer M.D.       • propofol (DIPRIVAN) infusion  0-80 mcg/kg/min Continuous Finn Brewer M.D. 5.3 mL/hr at 04/17/17 0126 15 mcg/kg/min at 04/17/17 0126   • NS infusion 2,000 mL  2,000 mL Once Shanice Graham M.D. 100 mL/hr at 04/16/17 2135 2,000 mL at 04/16/17 2135   • enalaprilat (VASOTEC) injection 1.25 mg  1.25 mg Q6HRS PRN Shanice Graham M.D.   1.25 mg at 04/16/17 2151   • haloperidol lactate (HALDOL) injection 5 mg  5 mg Q4HRS PRN Shanice Graham M.D.       • Respiratory Care per Protocol   Continuous RT Robert Subramanian M.D.       • lactulose 20 GM/30ML solution 30 mL  30 mL Q24HRS PRN Robert Subramanian M.D.       • bisacodyl (DULCOLAX) suppository 10 mg  10 mg Q24HRS PRN Robert Subramanian M.D.       • chlorhexidine (PERIDEX) 0.12 % solution 15 mL  15 mL BID Robert Subramanian M.D.   15 mL at 04/17/17 0115   • lidocaine (XYLOCAINE) 1%  injection  1-2 mL Q30 MIN PRN Robert Subramanian M.D.       • MD ALERT...Adult ICU Electrolyte Replacement per Pharmacy Protocol   pharmacy to dose Robert Subramanian M.D.         Last reviewed on 4/16/2017  7:33 PM by Ghazala [facility]        Quality  Measures:  Medications reviewed, EKG reviewed, Labs reviewed and Radiology images reviewed    Central line in place: Need for access    DVT Prophylaxis: Contraindicated - High bleeding risk  DVT prophylaxis - mechanical: SCDs  Ulcer prophylaxis: Yes        Assessment and Plan:  Impacted foreign body in the upper esophagus status post  successful surgical extraction with rigid esophagoscopy   - Cont vent support   - start SBTs   - limit sedation  Postoperative ventilator management   - cont vent support   - SBTs and ok weaning parameters   - discussed code status with DPOA and he is a DNR/DNI   - will extubate to comfort care measures  Acute kidney injury   - improved with IVF   - monitor urinary output  Alzheimer's dementia.   - advanced   - DNR/DNI at care facility  Benign prostatic hypertrophy.  Prognosis   - guarded   - discussed pt's overall status prior to events that lead to ER/ICU stay and pt with steady decline in both mental capacity as well as physical capacity.  Family would like patient to be extubated to comfort care at this time after a long discussion with regard to his overall quality of life with her advanced dementia      Discussed patient condition and risk of morbidity and/or mortality with RN, RT and Pharmacy.  Spoke with the patient's daughter who agrees to be present for extubation procedure.     The patient remains critically ill.  Critical care time = 40 minutes in directly providing and coordinating critical care and extensive data review.  No time overlap and excludes procedures.    Teresa SABILLON (Yusef), am scribing for, and in the presence of, Dr. Larsen.  Electronically signed by: Teresa Levine (Yusef), 4/17/2017  IDr. Larsen, personally performed the services described in this documentation, as scribed by Teresa Levine in my presence, and it is both accurate and complete.

## 2017-04-17 NOTE — CONSULTS
DATE OF SERVICE:  04/16/2017    REQUESTING PHYSICIAN:  Dr. Finn Brewer.    REASON FOR CONSULTATION:  Acute respiratory failure, intubated due to foreign   body impaction in the upper esophagus.    HISTORY OF PRESENT ILLNESS:  This is an 89-year-old male who has a history of   Alzheimer's dementia and lives in a skilled care facility.  Apparently, he has   been improving and was complaining of throat pain and gagging and coughing.    Staff could not find his dental bridge and they felt he may have swallowed it.    He was brought into the emergency department and GI was urgently consulted.    CT scan of the neck showed an ill-defined density in the hypopharynx and the   upper esophagus region.  The patient was intubated for airway protection and   GI performed an EGD with a hard plastic bridge was noted, but unable to be   retrieved from just below the upper esophageal sphincter.  He was then taken   to the operating room by Dr. Teixeira and underwent rigid esophagoscopy with   extraction of the foreign body which was indeed his partial bridge.  He was   brought back from the operating room to the intensive care unit and continued   on full mechanical ventilatory support as he recovers from anesthesia.  We   will likely extubate in the morning.    PAST MEDICAL HISTORY:  1.  Alzheimer's dementia.  2.  Systemic hypertension.  3.  PTSD.  4.  Benign prostatic hypertrophy.    ALLERGIES:  None known.    MEDICATIONS:  Reviewed.  These included Norvasc, Proscar, omeprazole, MiraLax,   Minipress, senna and docusate, Haldol, hydrocodone, Remeron.    SOCIAL HISTORY:  He is a resident of a skilled nursing facility and he denies   history of alcohol, tobacco or illicit drug use per report.    FAMILY HISTORY:  Unobtainable.    REVIEW OF SYSTEMS:  Unobtainable.    PHYSICAL EXAMINATION:  VITAL SIGNS:  Currently, sedated on full ventilatory support on propofol drip.    Blood pressure is 130/60, heart rate in the 60s, sinus rhythm,  saturating   99% on full ventilatory support.  GENERAL:  Elderly male sedated, arouses weakly and moves all extremities, but   not following commands currently.  HEENT:  Pupils equal, round and reactive.  Sclerae are anicteric.  Oral mucous   membranes pink, moist.  CARDIOVASCULAR:  Distant, regular.  ABDOMEN:  Soft, nontender.  EXTREMITIES:  Without edema, clubbing or cyanosis.  SKIN:  Warm and dry.  Capillary refill is normal.  NEUROLOGIC:  Sedated, weakly withdraws all extremities, not following   commands.    DIAGNOSTIC DATA:  Chest x-ray and CT scan of the neck reviewed.  White blood   cell count 11,200, hemoglobin 15.2, platelet count 189,000.  Sodium 136,   potassium 4.9, chloride 107, bicarbonate 21, BUN ____, creatinine 2.3, glucose   126.  Liver transaminase is normal, albumin 3.6.  Arterial blood gas pH 7.37,   pCO2 of 27, pO2 117 on 40% oxygen.    IMPRESSION:  1.  Impacted foreign body in the upper esophagus status post successful   surgical extraction with rigid esophagoscopy.  2.  Acute respiratory failure, intubated for airway protection during surgical   procedures.  3.  Acute kidney injury, query underlying chronic kidney disease.  4.  Alzheimer's dementia.  5.  Benign prostatic hypertrophy.  6.  Additional history as listed above.    RECOMMENDATIONS AND DISCUSSION:  1.  The patient will be continued on full mechanical ventilatory support as he   recovers from surgery.  ____ he has been placed on an ASV at 110%.  We will   continue this and perform spontaneous breathing trial in the morning and   likely extubate.  In the meantime, we will use low doses of propofol and   p.r.n. fentanyl to use as needed for analgesia We will continue with low tidal   volume lung protective strategies.  2.  We will follow up and correct electrolytes as appropriate.  3.  He received some crystalloid volume resuscitation.  We will follow renal   function.  4.  We will follow the patient closely with you.  Further  recommendations to   follow.  He is critically ill.    Critical care time 35 minutes.       ____________________________________     MD GISELLE BLANCO / PAPO    DD:  04/17/2017 00:22:00  DT:  04/17/2017 03:01:03    D#:  [id number]  Job#:  [id number]

## 2017-04-17 NOTE — ED NOTES
Med rec updated and complete  Allergies reviewed ,  Placed a call to family and received updated   Information.  Pt finished a course of septra ( 5 day course)  For a UTI.

## 2017-04-17 NOTE — PROGRESS NOTES
Spoke with Mariama (POA) about code status update and notified Dr. Larsen about families pending arrival on unit. Extubation pending until code status is changed to DNR/DNI.

## 2017-04-17 NOTE — FLOWSHEET NOTE
04/17/17 0806   Events/Summary/Plan   Events/Summary/Plan weqaning parameters   Weaning Parameters   RR (bpm) 17   #FVC / Vital Capacity (liters)  0.97   NIF (cm H2O)  -16   Rapid Shallow Breathing Index (RR/VT) 42   Spontaneous VE 6   Spontaneous

## 2017-04-17 NOTE — FLOWSHEET NOTE
04/17/17 1205   Events/Summary/Plan   Events/Summary/Plan weaning parameters    Weaning Parameters   RR (bpm) 10   #FVC / Vital Capacity (liters)  0.95   NIF (cm H2O)  -15   Rapid Shallow Breathing Index (RR/VT) 20   Spontaneous VE (!) 4.2   Spontaneous

## 2017-04-17 NOTE — ED NOTES
During procedure, pt very restless, grimacing, moving extremities. V/o received to admin additional sedative. 158/103 bp prior to admin

## 2017-04-17 NOTE — PROGRESS NOTES
Hospital Medicine Progress Note, Adult, Complex               Author: Michael Siddiqui Date & Time created: 4/17/2017  2:16 PM     Interval History:  88yo with Hx of advanced dementia brought in from [place] unit after swallowing his partial dentures  Rigid Esophagoscopy Dr Teixeira 4/16  Intubated 4/16  ROS unotainable as pt intubated    Review of Systems:  Review of Systems   Unable to perform ROS: intubated       Physical Exam:  Physical Exam   Constitutional: He appears well-developed and well-nourished. No distress.   HENT:   Head: Normocephalic and atraumatic.   Eyes: Conjunctivae are normal.   Neck: No JVD present.   Cardiovascular: Normal rate.  Exam reveals no gallop.    Murmur heard.  Pulmonary/Chest: No stridor. No respiratory distress. He has no wheezes. He has no rales.   ETT/vent   Abdominal: Soft. There is no tenderness. There is no rebound and no guarding.   Musculoskeletal: He exhibits no edema.   Neurological:   sedated   Skin: Skin is warm and dry. No rash noted. He is not diaphoretic.   Nursing note and vitals reviewed.      Labs:  Recent Labs      04/16/17   1641  04/16/17   2129  04/17/17   0457   ISTATAPH  7.281*  7.357*  7.314*   ISTATAPCO2  39.6*  28.1  29.4   ISTATAPO2  459*  123*  86   ISTATATCO2  20  17*  16*   [id number]  100*  99  96   ISTATARTHCO3  18.6  15.8*  15.0*   ISTATARTBE  -8*  -8*  -10*   ISTATTEMP  36.8 C  36.0 C  98.6 F   ISTATFIO2  100  40  30   ISTATSPEC  Arterial  Arterial  Arterial   ISTATAPHTC  7.284*  7.371*  7.314*   [id number]  458*  117*  86         Recent Labs      04/16/17   1327  04/17/17   0350   SODIUM  136  137   POTASSIUM  4.9  4.8   CHLORIDE  107  112   CO2  21  16*   BUN  37*  30*   CREATININE  2.30*  1.88*   MAGNESIUM   --   2.1   PHOSPHORUS   --   3.5   CALCIUM  9.5  8.6     Recent Labs      04/16/17   1327  04/17/17   0350   ALTSGPT  10  7   ASTSGOT  14  14   ALKPHOSPHAT  104*  82   TBILIRUBIN  1.5  1.4   GLUCOSE  126*  114*     Recent Labs       17   1327  17   0354   RBC  4.61*  3.91*   HEMOGLOBIN  15.2  13.1*   HEMATOCRIT  45.7  40.2*   PLATELETCT  189  160*   PROTHROMBTM  14.2   --    APTT  32.0   --    INR  1.07   --      Recent Labs      17   1327  17   0350  17   0354   WBC  11.2*   --   10.9*   NEUTSPOLYS  75.30*   --   74.50*   LYMPHOCYTES  15.40*   --   13.00*   MONOCYTES  8.50   --   11.70   EOSINOPHILS  0.20   --   0.20   BASOPHILS  0.20   --   0.20   ASTSGOT  14  14   --    ALTSGPT  10  7   --    ALKPHOSPHAT  104*  82   --    TBILIRUBIN  1.5  1.4   --            Hemodynamics:  Temp (24hrs), Av.7 °C (98 °F), Min:36 °C (96.8 °F), Max:37.3 °C (99.1 °F)  Temperature: 37 °C (98.6 °F)  Pulse  Av.2  Min: 53  Max: 88Heart Rate (Monitored): (!) 58  Blood Pressure : 160/79 mmHg, NIBP: (!) 166/72 mmHg     Respiratory:  Jeffrey Vent Mode: Spont, PEEP/CPAP: 8, FiO2: 30, P Peak (PIP): 14, P MEAN: 9.3 Respiration: (!) 34, Pulse Oximetry: 99 %     Work Of Breathing / Effort: Vented  RUL Breath Sounds: Clear;Diminished, RML Breath Sounds: Diminished, RLL Breath Sounds: Diminished, COLLETTE Breath Sounds: Clear;Diminished, LLL Breath Sounds: Diminished  Fluids:    Intake/Output Summary (Last 24 hours) at 17 1416  Last data filed at 17 1200   Gross per 24 hour   Intake 1603.14 ml   Output    685 ml   Net 918.14 ml     Weight: 55.6 kg (122 lb 9.2 oz)  GI/Nutrition:  Orders Placed This Encounter   Procedures   • Diet NPO     Standing Status: Standing      Number of Occurrences: 1      Standing Expiration Date:      Order Specific Question:  Type:     Answer:  Now [1]     Order Specific Question:  Restrict to:     Answer:  Strict [1]     Medical Decision Making, by Problem:  Active Hospital Problems    Diagnosis   • Late onset Alzheimer's disease with behavioral disturbance [G30.1, F02.81]  Resident of [facility] unit   • Acute respiratory failure with hypoxia (CMS-HCC) [J96.01]  Intubated for airway management  Pulmonology  following   • HTN (hypertension) [I10]  Resume po meds either by cortrak or po  Cont prn coverage   • Acute kidney failure (CMS-HCC) [N17.9]  Improved with IVF's  Cont to follow and rehydrate gently   • Foreign body alimentary tract [T18.9XXA]  Partials swallowed into upper esophagus now s/p extraction 4/16 by Dr Teixeira via rigid scope       Medications reviewed, EKG reviewed, Labs reviewed and Radiology images reviewed  Farias catheter: Unconscious / Sedated Patient on a Ventilator      DVT Prophylaxis: Heparin  DVT prophylaxis - mechanical: SCDs  Ulcer prophylaxis: Yes

## 2017-04-17 NOTE — ED PROVIDER NOTES
"ED Provider Note    CHIEF COMPLAINT  Chief Complaint   Patient presents with   • Foreign Body Swallowed     Pt to er from [facility] [place]. Nurses report pt has had trouble swallowing medication since yesterday, reports sore throat. Nurses today realized that patient's \"bridge\" or tooth retainer is missing. EMS states they searched patient's apartment. PT able to speak, no drooling, swallowing secretions. RT distress observed. PT even and unlabored breathing. Family reports to nursing home \"hoarse/raspy voice\".        HPI  Finn Granados is a 89 y.o. male with a history of Alzheimer's disease, hypertension, GERD, PTSD, recurrent urinary tract infections, kidney stents, who presents after possibly swallowing a foreign body. The patient has a partial bridge to his upper front teeth, and may have swallowed this according to family. The patient lives at a [facility] facility, according to staff, the patient has had some difficulty with swallowing yesterday. He has not eaten or taken any of his medications today. At about 10:00 this morning he started gagging, coughing, and complaining of severe throat pain. The family and staff looked around for the partial bridge but could not find it. They are concerned he may have swallowed it. Family says they think there may be a wire across the bridge, which has one tooth to fill a gap to his front teeth. The patient is unable to give me much information of what occurred. He does complain of severe pain to his throat along with difficulty swallowing. He has had no chest pain, back pain, or abdominal pain. He has had no vomiting or diarrhea. He has coughed up some greenish phlegm.    REVIEW OF SYSTEMS  See HPI for further details. All other systems are negative.     PAST MEDICAL HISTORY  Past Medical History   Diagnosis Date   • Alzheimer disease    • Hypertension    • Prostate enlargement    • GERD (gastroesophageal reflux disease)    • Constipation    • PTSD (post-traumatic stress " "disorder)    • Nightmares associated with chronic post-traumatic stress disorder    • History of frequent urinary tract infections    • Delusions (CMS-HCC)    • Hallucinations        FAMILY HISTORY  History reviewed. No pertinent family history.    SOCIAL HISTORY  Social History     Social History   • Marital Status: Unknown     Spouse Name: N/A   • Number of Children: N/A   • Years of Education: N/A     Social History Main Topics   • Smoking status: Never Smoker    • Smokeless tobacco: None   • Alcohol Use: No   • Drug Use: No   • Sexual Activity: Not Asked     Other Topics Concern   • None     Social History Narrative   • None       SURGICAL HISTORY  Past Surgical History   Procedure Laterality Date   • Stent placement       x2 kidney       CURRENT MEDICATIONS  Home Medications     Reviewed by Stefanie Batres R.N. (Registered Nurse) on 04/16/17 at 1238  Med List Status: Complete    Medication Last Dose Status    acetaminophen (TYLENOL) 500 MG Tab  Active    amlodipine (NORVASC) 5 MG Tab  Active    finasteride (PROSCAR) 5 MG Tab  Active    haloperidol (HALDOL) 0.5 MG Tab  Active    hydrocodone-acetaminophen (NORCO) 5-325 MG Tab per tablet  Active    mirtazapine (REMERON) 15 MG Tab  Active    nitroglycerin (NITROSTAT) 0.4 MG SL Tab  Active    Nutritional Supplements (ENSURE PO)  Active    omeprazole (PRILOSEC) 20 MG delayed-release capsule  Active    polyethylene glycol/lytes (MIRALAX) Pack  Active    prazosin (MINIPRESS) 1 MG Cap  Active    senna-docusate (PERICOLACE OR SENOKOT S) 8.6-50 MG Tab  Active    sulfamethoxazole-trimethoprim (BACTRIM DS) 800-160 MG tablet  Active                ALLERGIES  Not on File    PHYSICAL EXAM  VITAL SIGNS: /79 mmHg  Pulse 66  Temp(Src) 36.8 °C (98.3 °F)  Resp 18  Ht 1.727 m (5' 7.99\")  Wt 58.968 kg (130 lb)  BMI 19.77 kg/m2  SpO2 100%  Constitutional: Awake, alert, in no acute distress, there is mildly uncomfortable, holding his throat, Non-toxic appearance. "   HENT: Atraumatic. Bilateral external ears normal, mucous membranes very moist throat nonerythematous without exudates, no swelling to the soft palate or uvula, no foreign body visualized in the oropharynx, nose is normal.  Eyes: PERRL, EOMI, conjunctiva moist, noninjected.  Neck: Nontender, Normal range of motion, No nuchal rigidity, No stridor.   Lymphatic: No lymphadenopathy noted.   Cardiovascular: Regular rate and rhythm, no murmurs, rubs, gallops.  Thorax & Lungs:  Good breath sounds bilaterally, no wheezes, rales, or retractions.  No chest tenderness.  Abdomen: Bowel sounds normal, Soft, nontender, nondistended, no rebound, guarding, masses.  Back: No CVA or spinal tenderness.  Extremities: Intact distal pulses, No edema, No tenderness.   Skin: Warm, Dry, No rashes.   Musculoskeletal: No joint swelling or tenderness.  Neurologic: Alert & oriented x 3, sensory and motor function normal. No focal deficits.   Psychiatric: Affect normal, Judgment normal, Mood normal.     EKG          RADIOLOGY/PROCEDURES  DX-CHEST-PORTABLE (1 VIEW)   Final Result      Endotracheal tube in place.      Emphysema.      CT-SOFT TISSUE NECK W/O   Final Result      1.  Ill-defined area of increased attenuation in the hypopharynx/upper esophagus which appears to form the shape of the hypopharynx and suggestive of dense radiopaque fluid, medication or contrast. No metallic foreign body identified. There is    circumferential wall thickening involving the upper esophagus which could be related to stricture or mass.      DX-NECK FOR SOFT TISSUE   Final Result      No radiopaque foreign body.      Carotid atherosclerotic disease.      DX-CHEST-LIMITED (1 VIEW)   Final Result      Mass effect upon the right margin of the trachea may be secondary to vascular structures, enlarged thyroid gland or lymphadenopathy      Hyperinflation with some calcified pleural plaques may indicate asbestos-related pleural disease, prior traumatic injury or  infection      Likely hiatal hernia            COURSE & MEDICAL DECISION MAKING  Pertinent Labs & Imaging studies reviewed. (See chart for details)  The patient presents with the above complaints. IV was placed, he was given normal saline, morphine, and Zofran. Initially a chest x-ray in soft tissue x-ray of the neck was obtained which showed no radiopaque foreign body. A CT scan of the soft tissue neck was then obtained which showed the above findings, and what appears to be a possible foreign body in the hypopharynx/upper esophagus. Dr. Pina of gastroenterology was consulted, and requested that I intubate the patient prior to endoscopy, avoid dislodging a foreign body into his airway. The patient was intubated without difficulty. Dr. iPna is in to see the patient and performed a bedside endoscopy. He was able to see the foreign body which he described as horseshoe shaped, about 3 cm in length, it appear to be a hard plastic-like material. A slight multiple attempts with different instruments, he was unable to remove the foreign body. He noted there was quite a bit of swelling and spasming of the esophagus and the foreign body was just below the upper esophageal sphincter. He felt that someone using a rigid endoscope might have more success. I discussed the case with Dr. Linton of otolaryngology, who felt this was outside his field of expertise. Case was discussed with Dr. Chan of general surgery. He has arranged to have Dr. Teixeira of general surgery take the patient to the operating room tomorrow morning at 8 AM. Case was discussed with Dr. Graham for admission.  Dr. Subramanian of [facility] has been consulted.  Patient will be admitted to the ICU. CBC shows a white count 11,200, hemoglobin 15.2, 75% polys, chemistry shows BUN 37, creatinine 2.3, glucose 126, liver function test normal, INR 1.07 Critical care time of 40 minutes not including procedures.    Procedure note: Endotracheal intubation  The patient was  placed on cardiac and respiratory monitors. Risks and benefits were explained to family, and consent was obtained. The patient was initially titrated with 40 mg of propofol. Using a Claus blade, the esophagus and vocal cords were visualized. There was a moderate amount of greenish phlegm present which was suctioned and removed. No obvious foreign body was visualized. The patient was given an additional 40 mg of propofol, and then intubated with a 8.0 endotracheal tube without difficulty. There was good color change on capnography, good fogging of the tube, good bilateral breath sounds auscultated. Follow-up chest x-ray was obtained.    FINAL IMPRESSION  1. Esophageal foreign body  2. Endotracheal tube intubation for airway protection  3.         Electronically signed by: Finn Brewer, 4/16/2017 7:25 PM

## 2017-04-17 NOTE — CARE PLAN
Problem: Ventilation Defect:  Goal: Ability to achieve and maintain unassisted ventilation or tolerate decreased levels of ventilator support  Outcome: PROGRESSING AS EXPECTED  Intervention: Support and monitor invasive and noninvasive mechanical ventilation  Adult Ventilation Update    Total Vent Days: 2      Patient Lines/Drains/Airways Status    Active Airway      Name: Placement date: Placement time: Site: Days:     Airway Group ET Tube 8.0 04/16/17  1617    less than 1                                 Static Compliance (ml / cm H2O): 74.4 (04/16/17 2050)            (ASV) % MIN VOL: 110 (04/17/17 0238)  ASV Inspiratory Pressures  % Spontaneous 100                    Mobility Group  Activity Performed: Unable to mobilize (04/17/17 0000)  Pt Calls for Assistance: No (04/17/17 0000)  Reason Not Mobilized: Unstable condition (sedated and vented) (04/17/17 0000)    Events/Summary/Plan: ABG drawn, no vent changes indicated.  (04/17/17 3187)

## 2017-04-17 NOTE — DIETARY
NUTRITION - Order received for Metabolic Cart Study per Dietitian/RT. Study not indicated at this time. Dietitian/RT to order as needed. 88 yo male admitted with foreign body in alimentary tract.  Dentures removed from esophagus. Pt remains on vent. RN reports hopeful to extubated today.  BMI 18.64 on admit.  Pt with increased nutritional needs secondary to low body weight, advanced age and current inability to take oral diet.    Recommendations/Plan:  1) if unable to start po diet consider short term tube feedings now secondary to advanced age and low body weight  2) when diet begins consider supplements with meals to better meet nutritional needs

## 2017-04-17 NOTE — OR NURSING
Patient arrived to PACU from ER, intubated, ventilations assisted with BVM, on Zoll monitor, bedside hand off received from PRAMOD Baldwin. Dr. Herrera, Denise Teixeira, and Dinesh and Bruce BENAVIDEZ at patient's bedside, care transferred, patient taken to the OR.

## 2017-04-17 NOTE — OP REPORT
DATE OF SERVICE:  04/16/2017    PREOPERATIVE DIAGNOSIS:  Esophageal foreign body.    POSTOPERATIVE DIAGNOSIS:  Esophageal foreign body.    PROCEDURE PERFORMED:  Rigid esophagoscopy with extraction of esophageal   foreign body.    SURGEON:  Lindy Teixeira M.D.    ANESTHESIA:  General endotracheal.    ANESTHESIOLOGIST:  Ron Mistry M.D.    INDICATIONS:  The patient is an 89-year-old gentleman who has Alzheimers and apparently   swallowed its partial.  Subsequently, attempts were made to remove this by   flexible endoscopy, which was unsuccessful.  I have been asked to do a rigid   esophagoscopy.    FINDINGS:  The partial was found in the upper portion of the esophagus.  This   was removed using a rigid esophagoscope.    DESCRIPTION OF THE PROCEDURE:  After the patient was identified and family was   consented, he was brought to the operating room and placed in supine   position.  After underwent inhalation agents, his head was placed in   hyperextended position.  The Item was noted in the upper esophagus, it was   grasped and pulled out and ____ into the upper larynx and was able to be   removed using the laryngoscope and Megal forceps.  Patient tolerated the   procedure well.  He was left intubated and taken to intensive care in stable   condition.  All sponge and needle counts were correct.       ____________________________________     LINDY TEIXEIRA MD    [facility] / [facility]    DD:  04/16/2017 20:41:03  DT:  04/16/2017 21:01:52    D#:  [id number]  Job#:  [id number]    cc: RON MISTRY MD

## 2017-04-18 PROBLEM — D75.89 MACROCYTOSIS: Status: ACTIVE | Noted: 2017-01-01

## 2017-04-18 PROBLEM — R13.10 DYSPHAGIA: Status: ACTIVE | Noted: 2017-01-01

## 2017-04-18 PROBLEM — F43.10 PTSD (POST-TRAUMATIC STRESS DISORDER): Status: ACTIVE | Noted: 2017-01-01

## 2017-04-18 PROBLEM — N18.9 ACUTE ON CHRONIC RENAL FAILURE (HCC): Status: ACTIVE | Noted: 2017-01-01

## 2017-04-18 PROBLEM — N17.9 ACUTE ON CHRONIC RENAL FAILURE (HCC): Status: ACTIVE | Noted: 2017-01-01

## 2017-04-18 PROBLEM — N40.0 BPH (BENIGN PROSTATIC HYPERPLASIA): Status: ACTIVE | Noted: 2017-01-01

## 2017-04-18 NOTE — DISCHARGE PLANNING
Late entry from 4/17: Met with pt's daughters, Gerard and Radha. They have decided to transition pt to [facility]. Discussed hospice and they are in favor of hospice butwould like pt to be able to return to his home at [facility]. Pt is too combative at this time to be able to return there. Pt may require [facility]. CTT will continue to follow and assist family with end of life planning.

## 2017-04-18 NOTE — PROCEDURES
DATE OF SERVICE:  04/16/2017    PROCEDURE:  EGD diagnostic, unable to remove foreign body and also prolonged   time, more than an hour of endoscopy time.    CONSENT:  The patient had two physician consent between the ER physician and   myself.  The procedure had been explained to the family prior to the   procedure.    SEDATION:  Propofol per ER physician and nursing.    DESCRIPTION OF PROCEDURE:  The patient was positioned on his back.  The   endotracheal intubation was in place.  The upper endoscope was advanced   carefully in the posterior pharynx and directed towards the esophagus.  The   area was very edematous.  We could initially not discern any type of anatomy   because of the swelling.  We finally were able to slowly advance into the   proximal esophagus just at the level of the upper sphincter.  It appeared to   be very spastic and there appeared to be a bar that was indenting into the   esophagus.  Below that we could barely see the lumen, but the impression of a   foreign body was noted.  Finally, with gentle advancement, we were able to   advance into the lumen and get all the way down to the stomach, which appeared   normal except for hiatal hernia.  The scope was advanced.  The duodenum   appeared normal.  Scope was withdrawn.  Large foreign body was seen at UES.    Again, we directed the attention to the foreign body.  It appeared to be 180 degrees   like a horse shoe.  It was embedded just at and below the sphincter.  It could   not be dislodged.  We tried a variety of instruments and had a hard time   grasping it because it was very hard.  We tried a rat tooth, an Alligator   forceps, we tried to ensnare it, we also tried to put a balloon behind it, an   inflated ERCP balloon at 18 mm and pull it up, and we also tried a Stapleton Net.    We were unable to make it budge and appeared to be deeply embedded into the   wall.  There was no laceration.  There was only significant swelling.  We then   decided  to abort the procedure after more than an hour of endoscopy time.    RECOMMENDATION:  Remain intubated and next ask advice from ENT or thoracic   surgery to see if this could be removed with a rigid esophagoscope.       ____________________________________     Delbert Pina MD EMD / [facility]    DD:  04/16/2017 18:43:57  DT:  04/17/2017 18:19:43    D#:  [id number]  Job#:  [id number]

## 2017-04-18 NOTE — PROGRESS NOTES
Hospital Medicine Progress Note, Adult, Complex               Author: Cristobal Akinsdebbi Date & Time created: 4/18/2017  3:18 PM     Interval History:  Admitted for Foreign body obstruction, Respiratory failure, Renal failure.    Foreign body - failed EGD, extracted by Rigid esophagoscpy  Resp failure - intubated 4/16, extubated 4/17 for comfort care  Renal failure - crea had improved with IV fluids  Dysphagia - no TF per family  Dementia - advanced with behavioral disorder    Review of Systems:  Review of Systems   Unable to perform ROS: medical condition       Physical Exam:  Physical Exam   Constitutional: He appears well-developed.   HENT:   Head: Normocephalic and atraumatic.   Eyes: Conjunctivae are normal. Pupils are equal, round, and reactive to light.   Neck: No tracheal deviation present. No thyromegaly present.   Cardiovascular: Normal rate and regular rhythm.    Pulmonary/Chest: Effort normal.   Coarse breath sounds   Abdominal: Soft. Bowel sounds are normal. He exhibits no distension. There is no tenderness.   Lymphadenopathy:     He has no cervical adenopathy.   Neurological:   Awake, does not follow commands   Skin: Skin is warm and dry.   Nursing note and vitals reviewed.      Labs:  Recent Labs      04/16/17   1641  04/16/17   2129  04/17/17   0457   ISTATAPH  7.281*  7.357*  7.314*   ISTATAPCO2  39.6*  28.1  29.4   ISTATAPO2  459*  123*  86   ISTATATCO2  20  17*  16*   ZCAUMWR5GKU  100*  99  96   ISTATARTHCO3  18.6  15.8*  15.0*   ISTATARTBE  -8*  -8*  -10*   ISTATTEMP  36.8 C  36.0 C  98.6 F   ISTATFIO2  100  40  30   ISTATSPEC  Arterial  Arterial  Arterial   ISTATAPHTC  7.284*  7.371*  7.314*   [id number]  458*  117*  86         Recent Labs      04/16/17   1327  04/17/17   0350  04/18/17   1051   SODIUM  136  137  139   POTASSIUM  4.9  4.8  5.4   CHLORIDE  107  112  112   CO2  21  16*  18*   BUN  37*  30*  42*   CREATININE  2.30*  1.88*  2.51*   MAGNESIUM   --   2.1   --    PHOSPHORUS   --   3.5    --    CALCIUM  9.5  8.6  9.3     Recent Labs      17   1327  17   0350  17   1051   ALTSGPT  10  7   --    ASTSGOT  14  14   --    ALKPHOSPHAT  104*  82   --    TBILIRUBIN  1.5  1.4   --    GLUCOSE  126*  114*  115*     Recent Labs      17   1327  17   0354  17   1051   RBC  4.61*  3.91*  4.29*   HEMOGLOBIN  15.2  13.1*  14.3   HEMATOCRIT  45.7  40.2*  45.5   PLATELETCT  189  160*  190   PROTHROMBTM  14.2   --    --    APTT  32.0   --    --    INR  1.07   --    --      Recent Labs      17   1327  17   0350  17   03517   1051   WBC  11.2*   --   10.9*  11.4*   NEUTSPOLYS  75.30*   --   74.50*  74.10*   LYMPHOCYTES  15.40*   --   13.00*  12.80*   MONOCYTES  8.50   --   11.70  12.30   EOSINOPHILS  0.20   --   0.20  0.00   BASOPHILS  0.20   --   0.20  0.40   ASTSGOT  14  14   --    --    ALTSGPT  10  7   --    --    ALKPHOSPHAT  104*  82   --    --    TBILIRUBIN  1.5  1.4   --    --            Hemodynamics:  Temp (24hrs), Av.7 °C (98 °F), Min:36.6 °C (97.8 °F), Max:36.9 °C (98.4 °F)  Temperature: 36.9 °C (98.4 °F)  Pulse  Av.6  Min: 53  Max: 100Heart Rate (Monitored): 88  Blood Pressure : 118/68 mmHg, NIBP: 146/73 mmHg     Respiratory:    Respiration: 15, Pulse Oximetry: (!) 70 % (Nurse notified.)     Work Of Breathing / Effort: Vented  RUL Breath Sounds: Coarse Crackles, RML Breath Sounds: Diminished, RLL Breath Sounds: Diminished, COLLETTE Breath Sounds: Coarse Crackles, LLL Breath Sounds: Diminished  Fluids:    Intake/Output Summary (Last 24 hours) at 17 1518  Last data filed at 17 0400   Gross per 24 hour   Intake 101.78 ml   Output    330 ml   Net -228.22 ml        GI/Nutrition:  Orders Placed This Encounter   Procedures   • Diet NPO     Standing Status: Standing      Number of Occurrences: 1      Standing Expiration Date:      Order Specific Question:  Type:     Answer:  Now [1]     Order Specific Question:  Restrict to:     Answer:   Strict [1]     Medical Decision Making, by Problem:  Active Hospital Problems    Diagnosis   • *Foreign body alimentary tract [T18.9XXA]   • Acute on chronic renal failure (CMS-HCC) [N17.9, N18.9]   • Dysphagia [R13.10]   • Late onset Alzheimer's disease with behavioral disturbance [G30.1, F02.81]   • Acute respiratory failure with hypoxia (CMS-[facility]) [J96.01]   • BPH (benign prostatic hyperplasia) [N40.0]   • PTSD (post-traumatic stress disorder) [F43.10]   • Macrocytosis [D75.89]   • HTN (hypertension) [I10]        Comfort care measures.       Consult Inpatient Hospice per family request.    Medications reviewed, Labs reviewed and Radiology images reviewed  Farias catheter: No Farias          Ulcer prophylaxis: No

## 2017-04-18 NOTE — PROGRESS NOTES
Assumed care of pt, A/OX0, unable to communicate, opens eyes and closes shortly after. Q2 turns continued for comfort. Patient is on comfort care. On room air, stating in the low 60's. POA is aware of patients condition. Diet is NPO and ADAT when able to eat. Patient is not in distress, calm, sleeping. Hourly rounding set in place, call light is within reach, bed is in lowest position.

## 2017-04-18 NOTE — DISCHARGE PLANNING
Received choice from, [facility]. Choice form was right-faxed as well.

## 2017-04-18 NOTE — PROGRESS NOTES
Up to unit from S111. 2 skin RN check done with PRAMOD Kang. Generalized bruising noted, scab on left knuckle noted, pallor on BLE, sacral is intact. Farias catheter in place draining to gravity, see nursing communication. Pt resting comfortably in bed. RR at 14.

## 2017-04-18 NOTE — PROGRESS NOTES
Transfer report called to Melinda BENAVIDEZ for S625-2. RN understands report and accepts patient assignment. Patient to be transported off monitor to [facility].

## 2017-04-18 NOTE — CARE PLAN
Problem: Discharge Barriers/Planning  Goal: Patient’s continuum of care needs will be met  Outcome: PROGRESSING AS EXPECTED  Will consult hospice per MD during rounds, pt's family wants pt to go back to  as for now, SW on board    Problem: Pain Management  Goal: Pain level will decrease to patient’s comfort goal  Outcome: PROGRESSING AS EXPECTED  Remains to be on comfort care, PRN pain medications available.

## 2017-04-18 NOTE — PROGRESS NOTES
Pt resting with eyes closed. No s/s of pain or distress. Call light within reach. Room free of clutter. Will continue to monitor and provide care.

## 2017-04-18 NOTE — PROGRESS NOTES
Daughter (Radha) notified of Patient room change and last vital sign values before transfer. Family acknowledged report.

## 2017-04-18 NOTE — DISCHARGE PLANNING
Left message for Katty at [facility] to call me regarding status of referral.

## 2017-04-19 NOTE — CARE PLAN
Problem: Pain Management  Goal: Pain level will decrease to patient’s comfort goal  Outcome: PROGRESSING AS EXPECTED  Pt not in any sign of pain/distress, PRN medication available if needed.    Problem: Skin Integrity  Goal: Risk for impaired skin integrity will decrease  Outcome: PROGRESSING AS EXPECTED  Q2 turns in place.

## 2017-04-19 NOTE — PROCEDURES
DATE OF SERVICE:  04/16/2017    PREOPERATIVE DIAGNOSIS:  Foreign body in the esophagus.    POSTOPERATIVE DIAGNOSIS:  Foreign body in the esophagus.    PROCEDURE:  Esophagogastroduodenoscopy, prolonged time, diagnostic, unable to   remove the foreign body.    SEDATION:  Per anesthesia.    CONSENT:  Consent was obtained from the family by telephone.    DESCRIPTION OF THE PROCEDURE:  The patient was positioned in the decubitus   position.  Endotracheal intubation was in the place.  The upper endoscope   Olympus was advanced into the posterior pharynx.  We immediately could see the   foreign body lodged in the proximal esophagus.  It took some time to get by   and slide by with the upper diagnostic endoscope by Olympus.  We were able to   survey the entire stomach.  Duodenum has retroflexion of the stomach and   esophagus.  We tried a variety of instruments to remove the foreign body.  It   appeared to be shaped like a horse-shoe.  It was very hard and could not be   successfully grasped with rat-tooth, alligator forceps, the snare, or even the   Stapleton net.  We then tried to put a balloon behind it to pull it through the   sphincter, but the sphincter appeared to be very spastic upper sphincter.  It   was lodged just below that sphincter.  We tried for approximately an hour or   longer to remove this foreign body and we were not able to successfully remove   it with fear for lacerations if we would pull it harder.  The procedure was   then terminated.    RECOMMENDATION:  Surgical evaluation or ENT evaluation for rigid   esophagoscopy.  Try to overcome the pressure of the upper esophageal sphincter   and remove foreign body hopefully that way.       ____________________________________     Delbert Pina MD EMD / [facility]    DD:  04/18/2017 14:13:45  DT:  04/18/2017 21:46:30    D#:  [id number]  Job#:  [id number]

## 2017-04-19 NOTE — DISCHARGE PLANNING
Care Transition Team Assessment    Information Source  Orientation : Unable to Assess  Information Given By: Relative  Informant's Name:  (She Russell, daughter)  Who is responsible for making decisions for patient? : Legal next of kin  Name(s) of Primary Decision Maker:  (She Russell)    Readmission Evaluation  Is this a readmission?: No    Elopement Risk  Legal Hold: No  Ambulatory or Self Mobile in Wheelchair: No-Not an Elopement Risk  Elopement Risk: Not at Risk for Elopement         Discharge Preparedness  What is your plan after discharge?: Uncertain - pending medical team collaboration  What are your discharge supports?: Child  Prior Functional Level: Needs Assist with Activities of Daily Living, Needs Assist with Medication Management, Uses Wheelchair, Wheelchair Dependent  Difficulity with ADLs: Bathing, Brushing teeth, Dressing, Toileting, Walking  Difficulty with ADLs Comment:  (Alzheimers, Dementia)  Difficulity with IADLs: Cooking, Driving, Keeping track of finances, Laundry, Managing medication, Shopping, Using the telephone or computer  Difficulity with IADL Comments:  (Alzheimers, Dementia)    Functional Assesment  Prior Functional Level: Needs Assist with Activities of Daily Living, Needs Assist with Medication Management, Uses Wheelchair, Wheelchair Dependent    Finances  Financial Barriers to Discharge: No  Prescription Coverage: Yes              Advance Directive  Advance Directive?: POLST    Domestic Abuse  Have you ever been the victim of abuse or violence?: No    Psychological Assessment  History of Substance Abuse: None  History of Psychiatric Problems: No  Non-compliant with Treatment: No  Newly Diagnosed Illness: No    Discharge Risks or Barriers  Discharge risks or barriers?: Other (comment) (Comfort Care)  Patient risk factors: Other (comment) (Comfort Care)    Anticipated Discharge Information  Anticipated discharge disposition: Discharge needs currently unknown  Discharge Address:  ([address])  Discharge Contact Phone Number:  ([phone])

## 2017-04-19 NOTE — PROGRESS NOTES
Pt comfort care - hospice consult pending per SW, pt opens eyes to this RN's voice for a short period of time - unable to communicate, Q2 turns in place, family at bedside during AM rounds - MD spoke with family and answered questions/addressed concerns.

## 2017-04-19 NOTE — PROGRESS NOTES
Updated pt's family at bedside about pt's condition and how he did today during dayshift. All questions and concern addressed.

## 2017-04-20 NOTE — PROGRESS NOTES
Hospice CNA in to give pt a bed bath and change linens.  1600: Hospice chaplain in to pt's room, states he will leave devotional with pt.

## 2017-04-20 NOTE — PROGRESS NOTES
"Pt comfort care/inpatient hospice, sleeping in bed - pt arousable to voice and opens eyes for a short period of time, no signs of discomfort or pain evident, pt refuses vital signs this AM and states \"no no no\" when blood pressure cuff is placed around his arm, Q2 turns in place, [place] draining to gravity.  "

## 2017-04-20 NOTE — CARE PLAN
Problem: Pain  Goal: Alleviation of Pain or a reduction in pain to the patient’s comfort goal  Outcome: PROGRESSING AS EXPECTED  No S/S of pain noted. Pt resting in bed comfortably.    Problem: Elimination  Goal: Urinary elimination support  Outcome: PROGRESSING AS EXPECTED  Farias in place - hospice pt.

## 2017-04-20 NOTE — DISCHARGE SUMMARY
DISCHARGE DIAGNOSES:  1.  Foreign body on the alimentary tract.  2.  Acute on chronic renal failure.  3.  Dysphagia.  4.  Advanced Alzheimer's disease with behavioral disturbances.  5.  Respiratory failure with hypoxia.  6.  Benign prostatic hypertrophy.  7.  History of posttraumatic stress disorder.  8.  Macrocytosis.  9.  Hypertension.    CONSULTS:  1.  Pulmonary medicine, Dr. Robert Subramanian.  2.  Gastroenterology Dr. Delbert Pina.  3.  Surgery, Dr. Jan Walker.    PROCEDURES PERFORMED:  1.  Esophagogastroduodenoscopy with failed retrieval of foreign body in the   esophagus.  2.  Rigid esophagoscopy with extraction of esophageal foreign body.  3.  CT of soft tissue of the neck showed ill-defined area of this attenuation   in the hypopharynx, upper esophagus, which appears to ____ hypopharynx and   suggested of dense ____ medication or contrast.  4.  Metallic foreign body is identified.  There is circumferential wall   thickening involving his upper esophagus, which relates stricture or mass.    HISTORY OF PRESENT ILLNESS:  For detailed H and P, please see Dr. Graham's note   on 04/16/2017.  In brief summary, this is an 89-year-old white male with   known history of advanced Alzheimer's dementia, he was living in a care   facility for dementia.  For patient has dementia, he was brought in by his   caretakers after having difficulty swallowing his medications.  He was noted   have a dental bridge that was possibly missing.  CT scan of soft tissue neck   showed possible obstruction with the foreign body.  He went into acute   respiratory failure, had to be intubated.  Patient was admitted for further   evaluation and management.    HOSPITAL COURSE:  On admission, again, patient was intubated, he was admitted   to intensive care unit, placed on the ventilator, gastroenterology was   consulted.  They did an esophagogastroduodenoscopy; however, that failed   retrieval of the foreign body.  Surgery was then  consulted, rigid   esophagoscopy was done with retrieval to foreign body.  At this point, patient   is likely suffering from a possible esophageal obstruction as well as severe   dysphagia.  The family did not want to pursue tube feedings due to his   advanced dementia.  They wanted to place the patient on comfort care measures,   he was extubated.  He has been placed in comfort care measures.  He has also   been referred to inpatient hospice, who has accepted him.  We will gladly turn   over his care to them.  He will be discharged from care.  I recommend that he   could be continued comfort measures, which I am sure certain hospice will   address.       ____________________________________     MD SAKINA AVENDANO / PAPO    DD:  04/19/2017 18:03:42  DT:  04/20/2017 01:33:45    D#:  [id number]  Job#:  [id number]

## 2017-04-20 NOTE — PROGRESS NOTES
Assumed care of pt at shift change, report received from day shift RN. Hospice pt, sleeping, arousable, unable to verbalize needs. No s/s of discomfort noted. Q2 turns in place. All needs addressed at this time. Bed in low position, call light within reach, hourly rounding in place.

## 2017-04-21 NOTE — PROGRESS NOTES
Pt comfort care/inpatient hospice, family at bedside this AM, new scopolamine patch applied, pt shows no signs or symptoms of distress or pain - resting comfortable, [place] in place.

## 2017-04-21 NOTE — H&P
CHIEF COMPLAINT:  Transferred to hospitalized hospice patients' service after   swallowing a foreign body.    HISTORY OF PRESENT ILLNESS:  The patient is an 89-year-old male with a history   of severe Alzheimer's dementia who is the resident at a local care facility,   who was admitted to [facility] on 04/16/2017 with severe dysphagia.  Patient was   determined to have swallowed his partial bridge.  This was retrieved   surgically; however, patient has not regained his formal level of function.    He currently is not eating or drinking and is minimally responsive.  His   family does not wish for him to have aggressive treatment including a feeding   tube; therefore, he is now being transferred for comfort care only.  At this   time, the patient is comfortable.  He is not requiring much in the way of pain   medication or sedation.    PAST MEDICAL HISTORY:  End-stage Alzheimer's dementia, hypertension, and   benign prostatic hypertrophy.    SOCIAL HISTORY:  Resident at a local nursing facility.  No history of alcohol   or tobacco abuse.    MEDICATIONS:  Comfort meds only receiving infrequently.    PHYSICAL EXAMINATION:  GENERAL:  He arouses to verbal stimuli; however, is not very cooperative   with the exam.  The rest of the physical examination is as per Dr. Lundberg's   progress note from 04/18/2017 at 03:18 p.m.    ASSESSMENT AND PLAN:  This 89-year-old male status post aspiration of foreign   body, which was removed surgically.  The patient developed respiratory failure   postoperatively; however, has been extubated.  Currently, the family desires   comfort care only without placement of feeding tube or any further respiratory   support.  He will be placed on IV morphine and Ativan as needed to maintain   his comfort with the expectation of death in the next several days due to   continued decline.       ____________________________________     MORE CHAKRABORTY MD    MS / NTS    DD:  04/20/2017 15:18:59  DT:   04/20/2017 17:47:18    D#:  [id number]  Job#:  [id number]

## 2017-04-21 NOTE — PROGRESS NOTES
Patient sleeping at this time. Medicated once for pain after turning, patient becomes restless/agitated/moans. Farias draining small amount of dark yellow urine. Patient unable to follow commands and non verbal at time of assessment.

## 2017-04-21 NOTE — CARE PLAN
Problem: Pain  Goal: Alleviation of Pain or a reduction in pain to the patient’s comfort goal  Outcome: PROGRESSING AS EXPECTED  Patient sleeping at this time, comfortable. Medicated for pain during turns or when restless and moaning.

## 2017-04-22 NOTE — PROGRESS NOTES
Pt awake with eyes open this morning. With family bedside, making facial expressions and appears to be trying to talk. Oral care provided and morphine managing pain as well as providing turns and extra pillows.

## 2017-04-22 NOTE — CARE PLAN
Problem: Discharge Barriers/Planning  Goal: Patient’s Continuum of care needs are met  Outcome: PROGRESSING AS EXPECTED  Pt on comfort care, bed baths, oral care, pain management and privacy provided.    Problem: Pain  Goal: Alleviation of Pain or a reduction in pain to the patient’s comfort goal  Outcome: PROGRESSING AS EXPECTED  Position changes, extra pillows, IV morphine administered per MAR

## 2017-04-23 NOTE — CARE PLAN
Problem: Psychosocial needs  Goal: Spiritual needs incorporated in hospitalization  Outcome: PROGRESSING SLOWER THAN EXPECTED  Pt unable to verbalize to staff, pt is being educated as nurse is preforming duties such as rotating and giving medications.     Problem: Pain  Goal: Alleviation of Pain or a reduction in pain to the patient’s comfort goal  Outcome: PROGRESSING AS EXPECTED  Pt unable to state if in pain, Giving pain medication when pt moans or shows signs of agitation.

## 2017-04-23 NOTE — PROGRESS NOTES
Assumed care of pt at 1945. Pt on comfort care, rotating Q2hrs, pain medication being given PRN per MAR. Pt belongings at bedside.

## 2017-04-23 NOTE — CARE PLAN
Problem: Pain  Goal: Alleviation of Pain or a reduction in pain to the patient’s comfort goal  Intervention: Pain Management--Non Pharmacologic techniques. Examples: Relaxation, Distraction, Play Therapy, Activity Therapy, Massage  Sat at bedside and talked to patient. No obvious response.      Problem: Nutrition Deficit  Goal: Nutritional Status consistent with end of life  Intervention: Provide Oral Care  Patient accepted oral swabbing.

## 2017-04-23 NOTE — CARE PLAN
Problem: Safety  Goal: Free from accidental injury  Intervention: Assess for Fall Risk  Patient doesn't voluntarily move.      Problem: Skin Integrity  Goal: Skin Integrity is maintained  Intervention: Turn every 2 hours  Q 2 hr turning in place  Intervention: Protect pressure points  Repositioning with pillows frequently

## 2017-04-24 NOTE — PROGRESS NOTES
"At shift change this morning, patient's respirations were 12 per minute. Along about 10:00 I noticed that his respirations were steadily increasing to about 40 respirations per minute. He was bathed and shaved. His daughters and granddaughter came to visit, and each expressed the hope for the patient to \"pass\" peacefully. He was clearly using accessory muscles to help with the work of breathing. The hospice nurse came to see the patient, and his breathing had increased to 48 breaths per minute. He was properly medicated throughout the shift. At about 13:50, I checked the patient's pulse and it was very irregular. His breathing became irregular, and I sat with the patient and held his hand until he  at 13:58. He was declared  by 2 RNs at 14:24. His IV and [place] were removed and he was prepared for transport.  "

## 2017-04-24 NOTE — PROGRESS NOTES
Assumed care of patient after report from night shift RN. Patient unable to be assessed. Pupils fixed, patient lists to right side despite q 2 hr turns. Does not respond to questions, but does respond to noxious stimulation. Bed alarm on, bed locked and in the lowest position. Hourly rounding in place. Will continue to observe and provide care.

## 2017-04-24 NOTE — PROGRESS NOTES
Received report at bedside and assumed care of patient at 1900. Pt resting comfortably in bed at this time. Bed alarm in place, bed in low and locked position, call light within reach and used appropriately, non-slip socks on patient, hourly rounding in place. Pt in no signs of distress at this time.

## 2017-04-24 NOTE — CARE PLAN
Problem: Safety  Goal: Free from accidental injury  Bed alarm in place, bed in low and locked position, non-slip socks on patient, hourly rounding    Problem: Elimination  Goal: Urinary elimination support  Farias in place

## 2017-04-24 NOTE — CARE PLAN
Problem: Safety  Goal: Free from accidental injury  Intervention: Assess for Fall Risk  Fall precautions in place      Problem: Skin Integrity  Goal: Skin Integrity is maintained  Intervention: Turn every 2 hours  q 2 turns in place

## 2017-04-25 NOTE — DISCHARGE SUMMARY
CAUSE OF DEATH:  End-stage Alzheimer's dementia and aspiration of a foreign   body.    HOSPITAL COURSE:  The patient was an 89-year-old male with a history of severe   Alzheimer's dementia, who was a resident in a local care facility, who was   admitted to [facility] on 2017 with severe dysphagia.  The patient was   determined to have aspirated his partial bridge, which was removed surgically.    However, the patient continued to decline and was never able to take p.o.   again.  He was placed on comfort care on the hospitalized hospice patients'   service on 2017.  The patient was given IV morphine and Ativan as needed   to maintain his comfort.  He continued to decline and  peacefully on   2017 as anticipated.       ____________________________________     MORE CHAKRABORTY MD    MS / NTS    DD:  2017 08:04:45  DT:  2017 09:05:44    D#:  [id number]  Job#:  [id number]

## 2022-02-28 NOTE — DISCHARGE PLANNING
HEMATOLOGY/ONCOLOGY OUTPATIENT FOLLOW-UP NOTE     Patient: Bandar Lewis Date: 2/28/2022   YOB: 1946 Attending: Papi Jolley MD   76 year old male      Every effort was made to ensure an accurate, comprehensive note and summary of your care.  Sometimes there is a typo, transcription mistype and information may have been interpreted differently than was intended.   If there are any questions regarding a major correction that is needed, please do not hesitate to contact me.    Diagnosis:  ureteral carcinoma of the renal pelvis, s/p  right kidney nephroureterectomy    Chief Complaint: Follow-up    History of Present Illness:    Bandar Lewis presenting today for follow-up for his ureteral carcinoma of the renal pelvis. He is on carboplatin/gemzar.       Last chemo was C2D8 on 2/7/22.  He is due for cycle 3 of carboplatin/gemzar.This was delayed from last week due to neutropenia and declining kidney function.  He is here for consideration of treatment.  He feels well on the Midodrine.  BP's have been normalized.  Ranging 120-145 systolic.  He take 1-2 tablets daily.  He monitors his BP throughout the day.  No further dizziness or syncopal events  He has gotten some intermittent  IV fluids.  He is now drinking enough fluids. He has 2 small blood blisters on his lower lip that are improving.  He did have his right thyroid biopsied on 2/24/22. Path showed benign colloid.       Oncologic/Hematologic History:  --8/2/21          CT chest without contrast - stable exam, multiple calcified and noncalcified nodules are noted, largest noncalcified nodule measuring 5.8 mm, and is stable from prior evaluation  --9/14/21        CT urogram with and without contrast - 2.8 cm hypodense lesion in the upper pole of the right kidney, appearing to extend the upper right renal hilum in narrows the calices in the area, margins are somewhat ill-defined, this may be due to a urothelial tumor such as transitional cell carcinoma or an  Katty has returned my call to let me know she received referral and if reviewing it. She will call to give us status on [facility] referral.   atypical appearance of a renal cell carcinoma, recommended for follow-up with Urology  --10/26/21      Surgical pathology, bladder tumor resection - noninvasive urothelial carcinoma, high-grade with muscularis propria present and involved  --11/11/21      TURBT, Dr. Rosa - resection of erythematous/edematous tissue of bladder neck, pathology negative for neoplasm.  Instillation of intravesicular gemcitabine  --12/2/21        Right kidney, nephroureterectomy - pathology demonstrating invasive high-grade urothelial carcinoma with focal glandular differentiation, 4.5 cm in greatest dimension involving upper pole, with invasion of renal parenchyma and renal sinus fat (pT3), with metastatic urothelial carcinoma involving 1 of 15 lymph nodes, 2.4 cm in greatest dimension (1/15, pN2)  --1/10/22        start adjuvant carboplatin/gemcitabine, cycle 1 day 1  --Cycle 3 HELD  Due to neutropenia and CURTIS    Past Medical History:    GERD (gastroesophageal reflux disease)                        Essential (primary) hypertension                              Lyme disease                                                  Atrial fibrillation (CMS/HCC)                                 High cholesterol                                              Wears glasses                                                 Herniation of intervertebral disc between L5 a*               Chronic kidney disease                                          Comment: stage 3    Kidney stones                                                   Comment: Left in 2015, right [id number]    Mass of urinary bladder                         10/2021       Renal mass, right                               10/2021       Posterior vitreous degeneration, right          09/2010       Prostate cancer (CMS/HCC)                       2006          Urinary bladder cancer (CMS/HCC)                09/2016       Nodule of right lung                            08/2017         Comment:  Monitoring    Right shoulder pain                             2021          Atrial fibrillation (CMS/HCC)                                 Past Surgical History:   Procedure Laterality Date   • Anes cystourethroscopy w ureteroscopy w resect tumor  09/26/2016    Tumor ablation   • Appendectomy  06/29/2015    lap appy   • Blepharoplasty upper bilateral 1.5 hrs Bilateral    • Cardiac catherization  ~ age 60   • Cardiac electrophysiology study and ablation      x1   • Colonoscopy  06/09/2017   • Cystoscopy  10/12/2021    In office - Dr. Coreas   • Cystoscopy w/ ureteral stent placement Right 08/27/2016    and brush biopsy, stone extraction   • Cystoscopy w/ ureteral stent placement Left 09/14/2015    Bilateral retrogrades, left ureteral stent placement   • Epidural steroid injection   11/17/2021   • Eye surgery Bilateral ~2010    Bilateral cataract extraction w/ IOL implant   • Hernia repair  2006    Umbilical hernia repair   • Inguinal hernia repair Right ~1966   • Inguinal hernia repair Left 05/29/2019   • Knee surgery Right Early 1990s    arthroscopy   • Lap nephrectomy w/ureterectomy  12/02/2021    nephroureterectomy with lymph node dissection   • Prostatectomy  ~2006   • Transforaminal tai multi level  11/2021   • Transurethral resection of bladder tumor  10/26/2021    Intravesical Gencitabine   • Transurethral resection of bladder tumor  11/11/2021       Family History   Problem Relation Age of Onset   • Heart disease Mother    • Heart disease Sister         heart 91 age A & w   • Heart disease Brother         MI   • Patient is unaware of any medical problems Son    • Multiple Sclerosis Sister    • COPD Sister        ALLERGIES:   Allergen Reactions   • Contrast Media RASH   • Omnipen [Ampicillin] RASH       Social History:  Social History     Tobacco Use   • Smoking status: Never Smoker   • Smokeless tobacco: Never Used   Substance Use Topics   • Alcohol use: Yes     Alcohol/week: 0.0 - 3.0 standard drinks   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 Now they are staying with their son as he gets treatment      Medications   Current Outpatient Medications   Medication Sig   • midodrine (PROAMATINE) 2.5 MG tablet Take 1 tablet by mouth 3 times daily. Take to increase BP.  Hold if BP >140/90   • sulfamethoxazole-trimethoprim (BACTRIM DS) 800-160 MG per tablet Take 1 tablet by mouth 2 times daily.   • MULTIPLE VITAMIN PO Chewable tablet daily   • prochlorperazine (COMPAZINE) 10 MG tablet Take 1 tablet by mouth every 6 hours as needed for Nausea or Vomiting.   • dexamethasone (DECADRON) 4 MG tablet Take 2 tablets by mouth daily (with breakfast). Start the day after Carboplatin for 3 days. (6 tablets per each Carboplatin cycle needed)   • tiZANidine (ZANAFLEX) 2 MG tablet Take 0.5-1 tablets by mouth every 8 hours as needed (Pain). Driving precautions   • acetaminophen (TYLENOL) 500 MG tablet Take 1,000 mg by mouth every 6 hours as needed for Pain.    • atorvastatin (LIPITOR) 10 MG tablet Take 10 mg by mouth daily.   • famotidine (PEPCID) 20 MG tablet Take 20 mg by mouth daily.    • flecainide (TAMBOCOR) 100 MG tablet Take 100 mg by mouth 2 times daily. Indications: Atrial Fibrillation      No current facility-administered medications for this visit.       Pain: denies    ECOG Performance Status:  0 - Fully active, able to carry on all pre-disease activities without restrictions.    Advance Directives:  Not on file    Review of Systems: All systems were reviewed and are negative other than as detailed in HPI (History of Present Illness) above.    Physical Exam:  Vitals:   Visit Vitals  BP (!) 151/82   Pulse 72   Temp 98 °F (36.7 °C)   Resp 12   Wt 75.3 kg (166 lb 0.1 oz)   SpO2 94%   BMI 20.21 kg/m²       Constitutional: Alert, cooperative, oriented x3. Mood and affect appropriate. No acute distress noted.    ENT/Mouth: 2 small pinpoint blood blisters lower lip.   Neck: deferred  Respiratory: Lungs are  clear to auscultation without rhonchi or wheezing.   Cardiovascular: Regular rate and rhythm without murmurs, gallops or rubs.   Chest: Port site without induration, tenderness or erythema.  Abdomen: deferred  Musculoskeletal: No tenderness or swelling or obvious weakness. His gait is steady.   Neurologic: No gross motor or sensory deficits.  Integumentary: No rashes or jaundice    Laboratory/Imaging Data:  Recent Labs   Lab 02/28/22  0858 02/24/22  0810 02/21/22  0919   WBC 3.7* 3.6* 2.1*   RBC 3.22* 3.07* 2.89*   HGB 9.8* 9.3* 8.9*   HCT 30.6* 28.6* 26.9*   MCV 95.0 93.2 93.1   * 244 107*   Absolute Neutrophils 1.1* 1.3* 0.9*   Absolute Lymphocytes 1.5 1.7 0.5*   Absolute Monocytes 0.9 0.6 0.6   Absolute Eosinophils  0.1 0.0 0.0   Absolute Basophils 0.1 0.0 0.0     Recent Labs   Lab 02/28/22  0857 02/24/22  0810 02/21/22  0919 01/06/22  1340 12/03/21  0338   Glucose 100* 85 113*   < > 146*   Sodium 140 139 139   < > 141   Potassium 4.1 4.1 4.3   < > 4.7   BUN 29* 35* 46*   < > 34*   Creatinine 2.10* 2.20* 2.96*   < > 2.00*   Calcium 8.5 8.3* 8.5   < > 8.3*   Bilirubin, Total 0.6 0.5 0.6   < >  --    Magnesium  --   --  2.1  --  2.1   Albumin 3.6 3.6 3.4*   < >  --    GOT/AST 35 28 29   < >  --    Alkaline Phosphatase 61 64 58   < >  --    GPT 65* 53 50   < >  --     < > = values in this interval not displayed.     Recent Labs   Lab 02/28/22  0857 02/24/22  0810 02/21/22  0919 02/07/22  0900 01/31/22  0850 01/18/22  0856 01/10/22  0850   Anion Gap 14 11 17   < > 14 15 13   Globulin 3.3 3.3 3.1   < > 2.8 2.9 3.1   LD, Total  --   --   --   --  192 149 171    < > = values in this interval not displayed.         Imaging Studies:  2/4/22      ASSESSMENT AND PLAN:   ureteral carcinoma of the renal pelvis, s/p  right kidney nephroureterectomy:  -He has completed nephro ureterectomy, with pathology demonstrating T3 N2 disease on 12/2/21.    -The acknowledging the high risk nature of his malignancy, I have  recommended adjuvant chemotherapy, we discussed specifically administration of gemcitabine/carboplatin administered q. 3 weeks for total of 4 cycles   -He was due for cycle 3 last week-deferred due to neutropenic and has elevated creatinine. He did get IV fluids. Kidney function improved as well as BP.    -He is still neutropenic and kidney function is not back to baseline.  We will continue to hold.  - He will return in 2 weeks for labs, Dr Jolley and consideration of treatment.   - He had 2 cycles of adjuvant chemo, if not tolerated, we may need to consider whether to continue or stop chemo.  I will defer to Dr Jolley.    -Dr Jolley was updated and aware    ARF:  -He only has left kidney.   -Creatinine slowly improving  -push oral fluids  -Dr Rosa and Dr Traylor following    Hypotension:  - Dr Prince aware. Pt is on Midodrine 2.5 mg.  He can take q 8 hours.  Has been taking 1-2 times per day.    -Monitor BP at home  -Discussed safety with dizziness/weakness    Thyroid nodules: 2/24/22 Biopsy  Right upper thyroid, #1, fine needle aspiration:  - Findings consistent with nodular hyperplasia (colloid nodule; [place] category II)  Right lower thyroid, #2, fine needle aspiration:  - Findings consistent with nodular hyperplasia (colloid nodule; [place]          The indicated understanding of the diagnosis and agreed with the plan of care.  Patient is encouraged to call for any interval symptoms or concerns. Patient verbalizes and is in agreement with recommended plan of care.

## 2023-04-20 NOTE — CARE PLAN
Problem: Skin Integrity  Goal: Skin Integrity is maintained  Outcome: PROGRESSING AS EXPECTED  Q2 turns in place, [place] care given.    Problem: Pain  Goal: Alleviation of Pain or a reduction in pain to the patient’s comfort goal  Outcome: PROGRESSING AS EXPECTED  IV Morphine available if neeed for s/s of distress or pain         Pt given more food/juice. Pt denies further needs.

## (undated) DEVICE — BITE BLOCK ADULT 60FR (100EA/CA)

## (undated) DEVICE — BASKET RETRIEVAL TWISTER PLUS 26MM (5EA/BX)

## (undated) DEVICE — MASK ANESTHESIA ADULT  - (100/CA)

## (undated) DEVICE — SENSOR SPO2 NEO LNCS ADHESIVE (20/BX) SEE USER NOTES

## (undated) DEVICE — KIT ROOM DECONTAMINATION

## (undated) DEVICE — PROTECTOR ULNA NERVE - (36PR/CA)

## (undated) DEVICE — TOWELS CLOTH SURGICAL - (4/PK 20PK/CA)

## (undated) DEVICE — HEAD HOLDER JUNIOR/ADULT

## (undated) DEVICE — SET EXTENSION WITH 2 PORTS (48EA/CA) ***PART #2C8610 IS A SUBSTITUTE*****

## (undated) DEVICE — CAPTIVATOR II-15MM ROUND STIFF  (40/BX)

## (undated) DEVICE — KIT CUSTOM PROCEDURE SINGLE FOR ENDO  (15/CA)

## (undated) DEVICE — LEAD SET 6 DISP. EKG NIHON KOHDEN (100EA/CA) [9859].

## (undated) DEVICE — CANISTER SUCTION 3000ML MECHANICAL FILTER AUTO SHUTOFF MEDI-VAC NONSTERILE LF DISP  (40EA/CA)

## (undated) DEVICE — ELECTRODE 850 FOAM ADHESIVE - HYDROGEL RADIOTRNSPRNT (50/PK)

## (undated) DEVICE — TUBING CLEARLINK DUO-VENT - C-FLO (48EA/CA)

## (undated) DEVICE — FILM CASSETTE ENDO

## (undated) DEVICE — SET LEADWIRE 5 LEAD BEDSIDE DISPOSABLE ECG (1SET OF 5/EA)

## (undated) DEVICE — KIT ANESTHESIA W/CIRCUIT & 3/LT BAG W/FILTER (20EA/CA)

## (undated) DEVICE — SUCTION INSTRUMENT YANKAUER BULBOUS TIP W/O VENT (50EA/CA)